# Patient Record
Sex: FEMALE | Race: WHITE | NOT HISPANIC OR LATINO | Employment: OTHER | URBAN - NONMETROPOLITAN AREA
[De-identification: names, ages, dates, MRNs, and addresses within clinical notes are randomized per-mention and may not be internally consistent; named-entity substitution may affect disease eponyms.]

---

## 2023-01-28 PROBLEM — R41.3 MEMORY DIFFICULTIES: Status: ACTIVE | Noted: 2023-01-28

## 2023-01-28 PROBLEM — R42 VERTIGO: Status: ACTIVE | Noted: 2023-01-28

## 2023-01-28 PROBLEM — E78.5 HYPERLIPIDEMIA: Status: ACTIVE | Noted: 2023-01-28

## 2023-01-28 PROBLEM — R25.1 TREMOR OF BOTH HANDS: Status: ACTIVE | Noted: 2023-01-28

## 2023-01-28 PROBLEM — S52.023A OLECRANON FRACTURE: Status: ACTIVE | Noted: 2023-01-28

## 2023-01-28 PROBLEM — D75.839 THROMBOCYTHEMIA: Status: ACTIVE | Noted: 2023-01-28

## 2023-01-28 PROBLEM — I63.9 STROKE (MULTI): Status: ACTIVE | Noted: 2023-01-28

## 2023-01-28 PROBLEM — I25.84 CORONARY ARTERY CALCIFICATION: Status: ACTIVE | Noted: 2023-01-28

## 2023-01-28 PROBLEM — R56.9 CONVULSION (MULTI): Status: ACTIVE | Noted: 2023-01-28

## 2023-01-28 PROBLEM — D68.9 CLOTTING DISORDER (MULTI): Status: ACTIVE | Noted: 2023-01-28

## 2023-01-28 PROBLEM — R30.0 BURNING WITH URINATION: Status: ACTIVE | Noted: 2023-01-28

## 2023-01-28 PROBLEM — R05.9 COUGH: Status: ACTIVE | Noted: 2023-01-28

## 2023-01-28 PROBLEM — R42 DIZZINESS: Status: ACTIVE | Noted: 2023-01-28

## 2023-01-28 PROBLEM — N90.89 VULVAR LESION: Status: ACTIVE | Noted: 2023-01-28

## 2023-01-28 PROBLEM — I34.1 MITRAL VALVE PROLAPSE: Status: ACTIVE | Noted: 2023-01-28

## 2023-01-28 PROBLEM — M25.511 RIGHT SHOULDER PAIN: Status: ACTIVE | Noted: 2023-01-28

## 2023-01-28 PROBLEM — R11.0 NAUSEA IN ADULT: Status: ACTIVE | Noted: 2023-01-28

## 2023-01-28 PROBLEM — L73.9 FOLLICULITIS: Status: ACTIVE | Noted: 2023-01-28

## 2023-01-28 PROBLEM — I10 HIGH BLOOD PRESSURE: Status: ACTIVE | Noted: 2023-01-28

## 2023-01-28 PROBLEM — R29.898 WEAKNESS OF SHOULDER: Status: ACTIVE | Noted: 2023-01-28

## 2023-01-28 PROBLEM — I25.10 CORONARY ARTERY CALCIFICATION: Status: ACTIVE | Noted: 2023-01-28

## 2023-01-28 PROBLEM — F32.A DEPRESSION: Status: ACTIVE | Noted: 2023-01-28

## 2023-01-28 PROBLEM — M25.611 STIFFNESS OF RIGHT SHOULDER JOINT: Status: ACTIVE | Noted: 2023-01-28

## 2023-01-28 PROBLEM — F17.200 NICOTINE DEPENDENCE: Status: ACTIVE | Noted: 2023-01-28

## 2023-01-28 RX ORDER — EVENING PRIMROSE OIL 500 MG
CAPSULE ORAL
COMMUNITY
Start: 2021-10-25 | End: 2023-05-31 | Stop reason: WASHOUT

## 2023-01-28 RX ORDER — CIPROFLOXACIN 500 MG/1
1 TABLET ORAL 2 TIMES DAILY
COMMUNITY
Start: 2021-02-13 | End: 2023-03-13 | Stop reason: ALTCHOICE

## 2023-01-28 RX ORDER — MELOXICAM 7.5 MG/1
1 TABLET ORAL DAILY
COMMUNITY
Start: 2021-03-05 | End: 2023-05-31 | Stop reason: ALTCHOICE

## 2023-01-28 RX ORDER — CALCIUM CARBONATE/VITAMIN D3 500-10/5ML
LIQUID (ML) ORAL
COMMUNITY
Start: 2021-10-25

## 2023-01-28 RX ORDER — ASPIRIN 81 MG/1
1 TABLET ORAL DAILY
COMMUNITY
Start: 2018-09-12

## 2023-01-28 RX ORDER — AMLODIPINE BESYLATE 10 MG/1
1 TABLET ORAL DAILY
COMMUNITY
Start: 2021-10-25 | End: 2023-03-24 | Stop reason: SDUPTHER

## 2023-01-28 RX ORDER — FLUOXETINE HYDROCHLORIDE 20 MG/1
1 CAPSULE ORAL DAILY
COMMUNITY
Start: 2021-02-11 | End: 2023-03-13 | Stop reason: SDUPTHER

## 2023-01-28 RX ORDER — ACETAMINOPHEN, DEXTROMETHORPHAN HBR, DOXYLAMINE SUCCINATE, PHENYLEPHRINE HCL 650; 20; 12.5; 1 MG/30ML; MG/30ML; MG/30ML; MG/30ML
1 SOLUTION ORAL DAILY
COMMUNITY
Start: 2021-10-25

## 2023-01-28 RX ORDER — FLUOXETINE 20 MG/1
1 TABLET ORAL DAILY
COMMUNITY
Start: 2021-10-25

## 2023-01-28 RX ORDER — CALCIUM CARB, CITRATE, MALATE 250 MG
CAPSULE ORAL
COMMUNITY
Start: 2021-10-25 | End: 2023-05-31 | Stop reason: WASHOUT

## 2023-01-28 RX ORDER — ACETAMINOPHEN 500 MG
1 TABLET ORAL DAILY
COMMUNITY
Start: 2021-10-25

## 2023-01-28 RX ORDER — ONDANSETRON 4 MG/1
1 TABLET, ORALLY DISINTEGRATING ORAL EVERY 8 HOURS PRN
COMMUNITY
Start: 2022-02-08 | End: 2023-03-13 | Stop reason: ALTCHOICE

## 2023-01-28 RX ORDER — IBUPROFEN 100 MG/5ML
1 SUSPENSION, ORAL (FINAL DOSE FORM) ORAL DAILY
COMMUNITY
Start: 2021-10-25

## 2023-01-28 RX ORDER — MECLIZINE HYDROCHLORIDE 25 MG/1
1 TABLET ORAL 4 TIMES DAILY PRN
COMMUNITY
Start: 2022-05-17 | End: 2023-03-13 | Stop reason: ALTCHOICE

## 2023-01-28 RX ORDER — TIZANIDINE 4 MG/1
1 TABLET ORAL 3 TIMES DAILY PRN
COMMUNITY
Start: 2021-02-11 | End: 2023-03-13 | Stop reason: ALTCHOICE

## 2023-01-28 RX ORDER — LOVASTATIN 40 MG/1
1 TABLET ORAL DAILY
COMMUNITY
Start: 2018-09-12 | End: 2023-03-24 | Stop reason: SDUPTHER

## 2023-03-10 NOTE — PROGRESS NOTES
Subjective   Patient ID: Nima Chan is a 67 y.o. female who presents for Follow-up (6 months; memory issues; review labs).  HPI    67 year old F here for follow up:    Seizure: She had another episode in February. She states that she was not started on medication for seizures at the time. Was recommended to followup [facility] in 1 week with PCP. She believes she has some sort of followup scheduled with neurology in May but doesn't know who with.     Poor short term memory: started about 6 months or so ago. She denies family noticing. [place] and she could not remember where the [place] was [place] [address]. She is having issues with retaining information.     Hemorrhagic stroke: she had it beginning of 2004, she was in ICU at [facility]. She had a neurosurgeon Dr. Villarreal (he put an electrode in someone that had teretes). She was told by him that there was no physical reason for her stroke, She was put on Dilantin. She was not allowed to drink with it so she declined.    Elevated platelets: Following with Dr. Neal.    All other systems reviewed and negative for complaint unless stated above.     Review of Systems    Objective   Physical Exam    Gen: No acute distress. Alert and oriented x3.   HEENT: Normocephalic, atraumatic. PERRLA and EOMI, no conjunctival injection. B/L EAC are clear, TM's viewed are WNL. No rhinorrhea, no oropharyngeal lesions.  Neck: No lymphadenopathy, thyroid WNL.  CV: Regular rate and rhythm. Normal S1/S2.  Resp: CTAB/L. No wheezes or rhonchi appreciated.  Abdomen: Soft. Nontender. Nondistended. Bowel sounds normoactive. No guarding or rigidity.  Derm: Skin is warm and dry. No rashes appreciated or suspicious lesions noted.   Neuro: Cranial nerves intact. Normal gait.  Psych: Appropriate mood and affect. Normal speech and eye contact.   Extremities: No deformities appreciated. No severe edema.    Assessment/Plan        66yo female here for  followup:    #Elevated platelets  followup with heme/onc  suspect myeloproliferative disorder  asymptomatic, monitor    #Memory difficulties  encouraged socialization  MOCA test today    #HTN   continue amlodipine  Stress test showed occasional PVCs   continue asa     #Depression  Continue fluoxetine    #Hand Tremor   Will discuss at followup    #HCM  C-scope next due 2025  UTD for covid, declining flu and PNA vaccines     Spoke with son Sukh and disclosed that Nima had an appt in May, unsure if neurology, and he states that she does but he doesn't know who the neurologist was. States that she does have transport to this appointment. I recommended against her driving due to recent seizure. I recommended to consider trying keppra while awaiting neuro eval. Son states that he has concerns about sleep apnea and states that her symptoms occur only during sleep/at night.

## 2023-03-13 ENCOUNTER — OFFICE VISIT (OUTPATIENT)
Dept: PRIMARY CARE | Facility: CLINIC | Age: 68
End: 2023-03-13
Payer: MEDICARE

## 2023-03-13 VITALS
SYSTOLIC BLOOD PRESSURE: 158 MMHG | HEIGHT: 64 IN | HEART RATE: 80 BPM | DIASTOLIC BLOOD PRESSURE: 83 MMHG | BODY MASS INDEX: 23.22 KG/M2 | WEIGHT: 136 LBS

## 2023-03-13 DIAGNOSIS — G40.909 SEIZURE DISORDER (MULTI): Primary | ICD-10-CM

## 2023-03-13 DIAGNOSIS — I63.9 CEREBROVASCULAR ACCIDENT (CVA), UNSPECIFIED MECHANISM (MULTI): ICD-10-CM

## 2023-03-13 DIAGNOSIS — I10 PRIMARY HYPERTENSION: ICD-10-CM

## 2023-03-13 PROCEDURE — 3079F DIAST BP 80-89 MM HG: CPT | Performed by: FAMILY MEDICINE

## 2023-03-13 PROCEDURE — 99214 OFFICE O/P EST MOD 30 MIN: CPT | Performed by: FAMILY MEDICINE

## 2023-03-13 PROCEDURE — 3077F SYST BP >= 140 MM HG: CPT | Performed by: FAMILY MEDICINE

## 2023-03-13 PROCEDURE — 1159F MED LIST DOCD IN RCRD: CPT | Performed by: FAMILY MEDICINE

## 2023-03-13 RX ORDER — LEVETIRACETAM 500 MG/1
500 TABLET ORAL 2 TIMES DAILY
Qty: 60 TABLET | Refills: 2 | Status: SHIPPED | OUTPATIENT
Start: 2023-03-13 | End: 2023-03-14

## 2023-03-13 NOTE — PATIENT INSTRUCTIONS
Neurology:  Tejal Haywood ([facility]) [phone]  Lencho Johnson ([facility]) [phone]  Ugo Alvarez () [phone]  Danielle Garcia () [phone]  Dustin Mix () [phone]

## 2023-03-14 DIAGNOSIS — G40.909 SEIZURE DISORDER (MULTI): ICD-10-CM

## 2023-03-14 RX ORDER — LEVETIRACETAM 500 MG/1
TABLET ORAL
Qty: 180 TABLET | Refills: 1 | Status: SHIPPED | OUTPATIENT
Start: 2023-03-14 | End: 2023-05-31 | Stop reason: ALTCHOICE

## 2023-03-14 RX ORDER — TRAMADOL HYDROCHLORIDE 50 MG/1
50 TABLET ORAL EVERY 6 HOURS PRN
COMMUNITY
Start: 2022-04-29 | End: 2023-05-30 | Stop reason: ALTCHOICE

## 2023-03-24 DIAGNOSIS — E78.49 OTHER HYPERLIPIDEMIA: ICD-10-CM

## 2023-03-24 DIAGNOSIS — I10 PRIMARY HYPERTENSION: ICD-10-CM

## 2023-03-24 RX ORDER — AMLODIPINE BESYLATE 10 MG/1
10 TABLET ORAL DAILY
Qty: 30 TABLET | Refills: 11 | Status: SHIPPED | OUTPATIENT
Start: 2023-03-24 | End: 2023-03-31 | Stop reason: SDUPTHER

## 2023-03-24 RX ORDER — LOVASTATIN 40 MG/1
40 TABLET ORAL NIGHTLY
Qty: 30 TABLET | Refills: 11 | Status: SHIPPED | OUTPATIENT
Start: 2023-03-24 | End: 2023-10-24 | Stop reason: WASHOUT

## 2023-03-31 DIAGNOSIS — I10 PRIMARY HYPERTENSION: Primary | ICD-10-CM

## 2023-03-31 RX ORDER — AMLODIPINE BESYLATE 10 MG/1
10 TABLET ORAL DAILY
Qty: 30 TABLET | Refills: 11 | Status: SHIPPED | OUTPATIENT
Start: 2023-03-31 | End: 2024-05-28

## 2023-04-18 ENCOUNTER — TELEPHONE (OUTPATIENT)
Dept: PRIMARY CARE | Facility: CLINIC | Age: 68
End: 2023-04-18
Payer: MEDICARE

## 2023-04-18 DIAGNOSIS — G47.30 SLEEP APNEA, UNSPECIFIED TYPE: ICD-10-CM

## 2023-04-18 DIAGNOSIS — G47.9 SLEEP DISORDER, UNSPECIFIED: Primary | ICD-10-CM

## 2023-05-30 PROBLEM — R74.8 ELEVATED CREATINE KINASE LEVEL: Status: RESOLVED | Noted: 2023-05-30 | Resolved: 2023-05-30

## 2023-05-30 NOTE — PROGRESS NOTES
Subjective   Patient ID: Nima Chan is a 67 y.o. female who presents for Follow-up (Pt presents today for a 2 month follow up; both hand tremors, more so the right hand; no seizures; saw neuro in [place], apt in Aug with Dr. Dillon; saw neuro last week, Dr. Mckinney; ).    HPI   Seizure: She had another episode in February. She states that she was not started on medication for seizures at the time. Saw neuro last week in [place], Dr. Mckinney. Had testing that took over 3 hours. Challenging at times for her.   She has an appointment in August 23rd for Neuro psych. He is going to go over the testing with her. Not taking Keppra, no medications from neurology.     Hand tremors: More in the right hand. Still bothering her.      Poor short term memory: started about 6 months or so ago. She denies family noticing. [place] and she could not remember where the [place] was on [facility]. She is having issues with retaining information.      Hemorrhagic stroke: she had it beginning of 2004, she was in ICU at [facility]. She had a neurosurgeon Dr. Villarreal (he put an electrode in someone that had teretes). She was told by him that there was no physical reason for her stroke, She was put on Dilantin. She was not allowed to drink with it so she declined.     Elevated platelets: Following with Dr. Neal.     All other systems reviewed and negative for complaint unless stated above.     Review of Systems   Constitutional:  Negative for chills and fever.   HENT:  Negative for congestion, ear pain and rhinorrhea.    Eyes:  Negative for discharge and redness.   Respiratory:  Negative for cough, shortness of breath and wheezing.    Cardiovascular:  Negative for chest pain and leg swelling.   Gastrointestinal:  Negative for abdominal pain, constipation, diarrhea, nausea and vomiting.   Genitourinary:  Negative for difficulty urinating, frequency and urgency.   Musculoskeletal:  Negative for gait  "problem.   Skin:  Negative for rash and wound.   Neurological:  Negative for dizziness, weakness and headaches.   Psychiatric/Behavioral:  Negative for confusion. The patient is not nervous/anxious.        Objective   /80 (BP Location: Right arm, Patient Position: Sitting, BP Cuff Size: Adult)   Pulse 90   Ht 1.626 m (5' 4\")   Wt 61.6 kg (135 lb 12.8 oz)   BMI 23.31 kg/m²     Physical Exam  Vitals reviewed.   Constitutional:       Appearance: Normal appearance.   HENT:      Head: Normocephalic.      Right Ear: Tympanic membrane, ear canal and external ear normal.      Left Ear: Tympanic membrane, ear canal and external ear normal.      Nose: No rhinorrhea.      Mouth/Throat:      Mouth: Mucous membranes are moist.      Pharynx: Oropharynx is clear.   Eyes:      Pupils: Pupils are equal, round, and reactive to light.   Cardiovascular:      Rate and Rhythm: Normal rate and regular rhythm.      Pulses: Normal pulses.   Pulmonary:      Effort: Pulmonary effort is normal.      Breath sounds: Normal breath sounds.   Abdominal:      General: Abdomen is flat. Bowel sounds are normal.      Palpations: Abdomen is soft.   Musculoskeletal:         General: No tenderness. Normal range of motion.      Right lower leg: No edema.      Left lower leg: No edema.   Lymphadenopathy:      Cervical: No cervical adenopathy.   Skin:     General: Skin is warm and dry.      Findings: No rash.   Neurological:      Mental Status: She is alert and oriented to person, place, and time.   Psychiatric:         Mood and Affect: Mood normal.         Behavior: Behavior normal.       Assessment/Plan     #Elevated platelets  followup with heme/onc  suspect myeloproliferative disorder  asymptomatic, monitor     #Memory difficulties  encouraged socialization  MOCA test 26/30 in September  Neuro cleared for short distances      #HTN   continue amlodipine  Stress test showed occasional PVCs   continue asa      #Depression  Continue fluoxetine   "   #Hand Tremor   Trial propranolol      #HCM  C-scope next due 2025  UTD for covid, declining flu and PNA vaccines      Spoke with son Sukh and disclosed that Nima had an appt in May, unsure if neurology, and he states that she does but he doesn't know who the neurologist was. States that she does have transport to this appointment. I recommended against her driving due to recent seizure. I recommended to consider trying keppra while awaiting neuro eval. Son states that he has concerns about sleep apnea and states that her symptoms occur only during sleep/at night.

## 2023-05-31 ENCOUNTER — OFFICE VISIT (OUTPATIENT)
Dept: PRIMARY CARE | Facility: CLINIC | Age: 68
End: 2023-05-31
Payer: MEDICARE

## 2023-05-31 VITALS
SYSTOLIC BLOOD PRESSURE: 163 MMHG | DIASTOLIC BLOOD PRESSURE: 80 MMHG | HEIGHT: 64 IN | BODY MASS INDEX: 23.18 KG/M2 | WEIGHT: 135.8 LBS | HEART RATE: 90 BPM

## 2023-05-31 DIAGNOSIS — R25.1 TREMOR OF BOTH HANDS: Primary | ICD-10-CM

## 2023-05-31 PROCEDURE — 3079F DIAST BP 80-89 MM HG: CPT | Performed by: REGISTERED NURSE

## 2023-05-31 PROCEDURE — 1159F MED LIST DOCD IN RCRD: CPT | Performed by: REGISTERED NURSE

## 2023-05-31 PROCEDURE — 99213 OFFICE O/P EST LOW 20 MIN: CPT | Performed by: REGISTERED NURSE

## 2023-05-31 PROCEDURE — 3077F SYST BP >= 140 MM HG: CPT | Performed by: REGISTERED NURSE

## 2023-05-31 RX ORDER — PROPRANOLOL HYDROCHLORIDE 20 MG/1
20 TABLET ORAL 3 TIMES DAILY
Qty: 90 TABLET | Refills: 5 | Status: SHIPPED | OUTPATIENT
Start: 2023-05-31 | End: 2023-06-23

## 2023-05-31 ASSESSMENT — ENCOUNTER SYMPTOMS
RHINORRHEA: 0
NAUSEA: 0
NERVOUS/ANXIOUS: 0
WEAKNESS: 0
FEVER: 0
EYE REDNESS: 0
FREQUENCY: 0
CONSTIPATION: 0
ABDOMINAL PAIN: 0
DIZZINESS: 0
CHILLS: 0
WOUND: 0
HEADACHES: 0
WHEEZING: 0
SHORTNESS OF BREATH: 0
CONFUSION: 0
DIARRHEA: 0
COUGH: 0
DIFFICULTY URINATING: 0
EYE DISCHARGE: 0
VOMITING: 0

## 2023-06-23 DIAGNOSIS — R25.1 TREMOR OF BOTH HANDS: ICD-10-CM

## 2023-06-23 RX ORDER — PROPRANOLOL HYDROCHLORIDE 20 MG/1
TABLET ORAL
Qty: 90 TABLET | Refills: 5 | Status: SHIPPED | OUTPATIENT
Start: 2023-06-23 | End: 2023-10-16

## 2023-07-27 NOTE — PROGRESS NOTES
Subjective   Reason for Visit: Nima Chan is an 68 y.o. female here for a Medicare Wellness visit.          Reviewed all medications by prescribing practitioner or clinical pharmacist (such as prescriptions, OTCs, herbal therapies and supplements) and documented in the medical record.    HPI  Seizure: She had another episode in February. She states that she was not started on medication for seizures at the time. Saw neuro last week in [place], Dr. Mckinney. Had testing that took over 3 hours. Challenging at times for her.   She has an appointment in August 23rd for Neuro psych. He is going to go over the testing with her. Not taking Keppra, no medications from neurology.     Hand tremors: More in the right hand. Still bothering her. She noticed improvement with propanolol. Would like to continue current dose.      Poor short term memory: started about 6 months or so ago. She denies family noticing. [place] and she could not remember where the [place] was on [facility]. She is having issues with retaining information.      Hemorrhagic stroke: she had it beginning of 2004, she was in ICU at [facility]. She had a neurosurgeon Dr. Villarreal (he put an electrode in someone that had teretes). She was told by him that there was no physical reason for her stroke, She was put on Dilantin. She was not allowed to drink with it so she declined.     Elevated platelets: Following with Dr. Neal.     All other systems reviewed and negative for complaint unless stated above.     Patient Care Team:  DANIELLE Preciado-CNP as PCP - General  Elizabeth Suero DO as PCP - [employer] PCP     Review of Systems   Constitutional:  Negative for chills and fever.   HENT:  Negative for congestion, ear pain and rhinorrhea.    Eyes:  Negative for discharge and redness.   Respiratory:  Negative for cough, shortness of breath and wheezing.    Cardiovascular:  Negative for chest pain and  "leg swelling.   Gastrointestinal:  Negative for abdominal pain, constipation, diarrhea, nausea and vomiting.   Genitourinary:  Negative for difficulty urinating, frequency and urgency.   Musculoskeletal:  Negative for gait problem.   Skin:  Negative for rash and wound.   Neurological:  Negative for dizziness, weakness and headaches.   Psychiatric/Behavioral:  Negative for confusion. The patient is not nervous/anxious.        Objective   Vitals:  /81 (BP Location: Right arm, Patient Position: Sitting, BP Cuff Size: Adult)   Pulse 74   Ht 1.626 m (5' 4\")   Wt 61.5 kg (135 lb 9.6 oz)   SpO2 97%   BMI 23.28 kg/m²       Physical Exam  Vitals reviewed.   Constitutional:       Appearance: Normal appearance.   HENT:      Head: Normocephalic.      Right Ear: Tympanic membrane, ear canal and external ear normal.      Left Ear: Tympanic membrane, ear canal and external ear normal.      Nose: No rhinorrhea.      Mouth/Throat:      Mouth: Mucous membranes are moist.      Pharynx: Oropharynx is clear.   Eyes:      Pupils: Pupils are equal, round, and reactive to light.   Cardiovascular:      Rate and Rhythm: Normal rate and regular rhythm.      Pulses: Normal pulses.   Pulmonary:      Effort: Pulmonary effort is normal.      Breath sounds: Normal breath sounds.   Abdominal:      General: Abdomen is flat. Bowel sounds are normal.      Palpations: Abdomen is soft.   Musculoskeletal:         General: No tenderness. Normal range of motion.      Right lower leg: No edema.      Left lower leg: No edema.   Lymphadenopathy:      Cervical: No cervical adenopathy.   Skin:     General: Skin is warm and dry.      Findings: No rash.   Neurological:      Mental Status: She is alert and oriented to person, place, and time.   Psychiatric:         Mood and Affect: Mood normal.         Behavior: Behavior normal.       Assessment/Plan   Problem List Items Addressed This Visit    None       #Elevated platelets  followup with " heme/onc  suspect myeloproliferative disorder  asymptomatic, monitor     #Memory difficulties  encouraged socialization  MOCA test 26/30 in September 2022  Will repeat at follow up in October 2024  Neuro cleared for short distances      #HTN   continue amlodipine  Stress test showed occasional PVCs   continue asa      #Depression  Continue fluoxetine     #Hand Tremor   Propranolol is working well   Continue   May need to increase frequency/dose in future         #HCM  C-scope next due 2025  UTD for covid, declining flu and PNA vaccines   Mammogram in 2020, declines today

## 2023-07-28 ENCOUNTER — OFFICE VISIT (OUTPATIENT)
Dept: PRIMARY CARE | Facility: CLINIC | Age: 68
End: 2023-07-28
Payer: MEDICARE

## 2023-07-28 VITALS
BODY MASS INDEX: 23.15 KG/M2 | DIASTOLIC BLOOD PRESSURE: 81 MMHG | OXYGEN SATURATION: 97 % | WEIGHT: 135.6 LBS | HEIGHT: 64 IN | HEART RATE: 74 BPM | SYSTOLIC BLOOD PRESSURE: 167 MMHG

## 2023-07-28 DIAGNOSIS — Z00.00 ROUTINE GENERAL MEDICAL EXAMINATION AT HEALTH CARE FACILITY: Primary | ICD-10-CM

## 2023-07-28 DIAGNOSIS — R25.1 TREMOR OF BOTH HANDS: ICD-10-CM

## 2023-07-28 DIAGNOSIS — I10 PRIMARY HYPERTENSION: ICD-10-CM

## 2023-07-28 DIAGNOSIS — R41.3 MEMORY DIFFICULTIES: ICD-10-CM

## 2023-07-28 PROCEDURE — 1159F MED LIST DOCD IN RCRD: CPT | Performed by: REGISTERED NURSE

## 2023-07-28 PROCEDURE — 1126F AMNT PAIN NOTED NONE PRSNT: CPT | Performed by: REGISTERED NURSE

## 2023-07-28 PROCEDURE — 3079F DIAST BP 80-89 MM HG: CPT | Performed by: REGISTERED NURSE

## 2023-07-28 PROCEDURE — 1170F FXNL STATUS ASSESSED: CPT | Performed by: REGISTERED NURSE

## 2023-07-28 PROCEDURE — G0439 PPPS, SUBSEQ VISIT: HCPCS | Performed by: REGISTERED NURSE

## 2023-07-28 PROCEDURE — 3077F SYST BP >= 140 MM HG: CPT | Performed by: REGISTERED NURSE

## 2023-07-28 ASSESSMENT — ACTIVITIES OF DAILY LIVING (ADL)
MANAGING_FINANCES: INDEPENDENT
DRESSING: INDEPENDENT
TAKING_MEDICATION: INDEPENDENT
DOING_HOUSEWORK: INDEPENDENT
GROCERY_SHOPPING: INDEPENDENT
BATHING: INDEPENDENT

## 2023-07-28 ASSESSMENT — ENCOUNTER SYMPTOMS
NERVOUS/ANXIOUS: 0
ABDOMINAL PAIN: 0
NAUSEA: 0
COUGH: 0
CONSTIPATION: 0
FREQUENCY: 0
DEPRESSION: 0
DIARRHEA: 0
DIZZINESS: 0
EYE DISCHARGE: 0
DIFFICULTY URINATING: 0
EYE REDNESS: 0
HEADACHES: 0
CHILLS: 0
WOUND: 0
SHORTNESS OF BREATH: 0
VOMITING: 0
OCCASIONAL FEELINGS OF UNSTEADINESS: 0
FEVER: 0
WHEEZING: 0
LOSS OF SENSATION IN FEET: 0
WEAKNESS: 0
CONFUSION: 0
RHINORRHEA: 0

## 2023-07-28 ASSESSMENT — PATIENT HEALTH QUESTIONNAIRE - PHQ9
2. FEELING DOWN, DEPRESSED OR HOPELESS: NOT AT ALL
1. LITTLE INTEREST OR PLEASURE IN DOING THINGS: NOT AT ALL
SUM OF ALL RESPONSES TO PHQ9 QUESTIONS 1 AND 2: 0

## 2023-08-01 ENCOUNTER — PATIENT OUTREACH (OUTPATIENT)
Dept: PRIMARY CARE | Facility: CLINIC | Age: 68
End: 2023-08-01
Payer: MEDICARE

## 2023-08-01 NOTE — PROGRESS NOTES
Discharge Facility: [facility]   Discharge Diagnosis:  alcohol withdrawal seizures  Admission Date: 7-30-23  Discharge Date: 7-31-23     PCP Appointment Date: 8-7-23  TCM call completed (8-2-23)  Patient is scheduled within 7-14 days of discharge on (8-7-23 ) for hospital follow up, however was unable to reach patient during two business days after discharge despite at least two attempts made.  I  f patient meets criteria for moderately complex & has follow-up within 14 days-can bill [id number] for hospital follow up.   If patient meets criteria for highly complex & has follow-up visit within 7 days-can bill [id number] for hospital follow up    * Virtual follow up needs modifier added (95 or GT)  AWV AND TCM CAN BE BILLED TOGETHER WITH 25 MODIFIER  Nikki Wakefield LPN

## 2023-08-02 RX ORDER — GABAPENTIN 100 MG/1
CAPSULE ORAL
COMMUNITY
Start: 2023-07-31 | End: 2023-08-31 | Stop reason: SDUPTHER

## 2023-08-02 RX ORDER — CARBAMAZEPINE 200 MG/1
TABLET, EXTENDED RELEASE ORAL
COMMUNITY
Start: 2023-07-31 | End: 2023-08-07 | Stop reason: SDUPTHER

## 2023-08-02 NOTE — PROGRESS NOTES
"Subjective   Patient ID: Nima Chan is a 68 y.o. female who presents for Hospital Follow-up (She states she if feeling constantly tired since hospital, haven't had a beer since; was given gabapentin and carbamazepine and would like to discuss these two; blood work to be ordered; ).    HPI     HFU: Etoh withdrawal seizures. Thinks the medications she is on is causing her to be tired. Would like to lower dose.   Concerned for liver disease, recent blood work has been unremarkable, her CT abdomen showed a cyst on on liver.   States she is always tired. States she has not had a beer since she left the hospital. She was started on gabapentin and carbamazepine.     All other systems reviewed and negative for complaint unless stated above.    Review of Systems    Objective   BP (!) 181/89 (BP Location: Right arm, Patient Position: Sitting, BP Cuff Size: Adult)   Pulse 68   Ht 1.626 m (5' 4\")   Wt 61.5 kg (135 lb 9.6 oz)   SpO2 100%   BMI 23.28 kg/m²     Physical Exam  Vitals reviewed.   Constitutional:       Appearance: Normal appearance.   HENT:      Head: Normocephalic.      Right Ear: Tympanic membrane, ear canal and external ear normal.      Left Ear: Tympanic membrane, ear canal and external ear normal.      Nose: No rhinorrhea.      Mouth/Throat:      Mouth: Mucous membranes are moist.      Pharynx: Oropharynx is clear.   Eyes:      Pupils: Pupils are equal, round, and reactive to light.   Cardiovascular:      Rate and Rhythm: Normal rate and regular rhythm.      Pulses: Normal pulses.   Pulmonary:      Effort: Pulmonary effort is normal.      Breath sounds: Normal breath sounds.   Abdominal:      General: Abdomen is flat. Bowel sounds are normal.      Palpations: Abdomen is soft.   Musculoskeletal:         General: No tenderness. Normal range of motion.      Right lower leg: No edema.      Left lower leg: No edema.   Lymphadenopathy:      Cervical: No cervical adenopathy.   Skin:     General: Skin is warm " and dry.      Findings: No rash.   Neurological:      Mental Status: She is alert and oriented to person, place, and time.   Psychiatric:         Mood and Affect: Mood normal.         Behavior: Behavior normal.         Assessment/Plan        #Hfu   #Etoh use   #Seizures  Continue to abstain from etoh!!   Follow up with neuro on 8/23  Continue gabapentin and carbamazepine   Do not drive until eval with neuro   Discussed cutting back on marijuana use

## 2023-08-07 ENCOUNTER — OFFICE VISIT (OUTPATIENT)
Dept: PRIMARY CARE | Facility: CLINIC | Age: 68
End: 2023-08-07
Payer: MEDICARE

## 2023-08-07 VITALS
WEIGHT: 135.6 LBS | SYSTOLIC BLOOD PRESSURE: 181 MMHG | HEART RATE: 68 BPM | OXYGEN SATURATION: 100 % | HEIGHT: 64 IN | BODY MASS INDEX: 23.15 KG/M2 | DIASTOLIC BLOOD PRESSURE: 89 MMHG

## 2023-08-07 DIAGNOSIS — G47.30 SLEEP APNEA, UNSPECIFIED TYPE: ICD-10-CM

## 2023-08-07 DIAGNOSIS — E87.8 ELECTROLYTE IMBALANCE: ICD-10-CM

## 2023-08-07 DIAGNOSIS — Z09 HOSPITAL DISCHARGE FOLLOW-UP: Primary | ICD-10-CM

## 2023-08-07 DIAGNOSIS — R56.9 SEIZURE (MULTI): ICD-10-CM

## 2023-08-07 PROCEDURE — 1159F MED LIST DOCD IN RCRD: CPT | Performed by: REGISTERED NURSE

## 2023-08-07 PROCEDURE — 1160F RVW MEDS BY RX/DR IN RCRD: CPT | Performed by: REGISTERED NURSE

## 2023-08-07 PROCEDURE — 3079F DIAST BP 80-89 MM HG: CPT | Performed by: REGISTERED NURSE

## 2023-08-07 PROCEDURE — 99214 OFFICE O/P EST MOD 30 MIN: CPT | Performed by: REGISTERED NURSE

## 2023-08-07 PROCEDURE — 1126F AMNT PAIN NOTED NONE PRSNT: CPT | Performed by: REGISTERED NURSE

## 2023-08-07 PROCEDURE — 3077F SYST BP >= 140 MM HG: CPT | Performed by: REGISTERED NURSE

## 2023-08-07 RX ORDER — CARBAMAZEPINE 100 MG/1
100 TABLET, EXTENDED RELEASE ORAL 2 TIMES DAILY
Qty: 60 TABLET | Refills: 1 | Status: SHIPPED | OUTPATIENT
Start: 2023-08-07 | End: 2023-08-31 | Stop reason: SDUPTHER

## 2023-08-09 ENCOUNTER — LAB (OUTPATIENT)
Dept: LAB | Facility: LAB | Age: 68
End: 2023-08-09
Payer: MEDICARE

## 2023-08-09 DIAGNOSIS — E87.8 ELECTROLYTE IMBALANCE: ICD-10-CM

## 2023-08-09 LAB
ALANINE AMINOTRANSFERASE (SGPT) (U/L) IN SER/PLAS: 13 U/L (ref 7–45)
ALBUMIN (G/DL) IN SER/PLAS: 4.8 G/DL (ref 3.4–5)
ALKALINE PHOSPHATASE (U/L) IN SER/PLAS: 72 U/L (ref 33–136)
ANION GAP IN SER/PLAS: 12 MMOL/L (ref 10–20)
ASPARTATE AMINOTRANSFERASE (SGOT) (U/L) IN SER/PLAS: 16 U/L (ref 9–39)
BASOPHILS (10*3/UL) IN BLOOD BY AUTOMATED COUNT: 0.11 X10E9/L (ref 0–0.1)
BASOPHILS/100 LEUKOCYTES IN BLOOD BY AUTOMATED COUNT: 1.2 % (ref 0–2)
BILIRUBIN TOTAL (MG/DL) IN SER/PLAS: 0.3 MG/DL (ref 0–1.2)
CALCIUM (MG/DL) IN SER/PLAS: 9.3 MG/DL (ref 8.6–10.3)
CARBON DIOXIDE, TOTAL (MMOL/L) IN SER/PLAS: 29 MMOL/L (ref 21–32)
CHLORIDE (MMOL/L) IN SER/PLAS: 88 MMOL/L (ref 98–107)
CREATININE (MG/DL) IN SER/PLAS: 0.6 MG/DL (ref 0.5–1.05)
EOSINOPHILS (10*3/UL) IN BLOOD BY AUTOMATED COUNT: 0.36 X10E9/L (ref 0–0.7)
EOSINOPHILS/100 LEUKOCYTES IN BLOOD BY AUTOMATED COUNT: 4 % (ref 0–6)
ERYTHROCYTE DISTRIBUTION WIDTH (RATIO) BY AUTOMATED COUNT: 12.2 % (ref 11.5–14.5)
ERYTHROCYTE MEAN CORPUSCULAR HEMOGLOBIN CONCENTRATION (G/DL) BY AUTOMATED: 34.7 G/DL (ref 32–36)
ERYTHROCYTE MEAN CORPUSCULAR VOLUME (FL) BY AUTOMATED COUNT: 93 FL (ref 80–100)
ERYTHROCYTES (10*6/UL) IN BLOOD BY AUTOMATED COUNT: 4.1 X10E12/L (ref 4–5.2)
GFR FEMALE: >90 ML/MIN/1.73M2
GLUCOSE (MG/DL) IN SER/PLAS: 93 MG/DL (ref 74–99)
HEMATOCRIT (%) IN BLOOD BY AUTOMATED COUNT: 38 % (ref 36–46)
HEMOGLOBIN (G/DL) IN BLOOD: 13.2 G/DL (ref 12–16)
IMMATURE GRANULOCYTES/100 LEUKOCYTES IN BLOOD BY AUTOMATED COUNT: 0.2 % (ref 0–0.9)
LEUKOCYTES (10*3/UL) IN BLOOD BY AUTOMATED COUNT: 9 X10E9/L (ref 4.4–11.3)
LYMPHOCYTES (10*3/UL) IN BLOOD BY AUTOMATED COUNT: 2.78 X10E9/L (ref 1.2–4.8)
LYMPHOCYTES/100 LEUKOCYTES IN BLOOD BY AUTOMATED COUNT: 31 % (ref 13–44)
MONOCYTES (10*3/UL) IN BLOOD BY AUTOMATED COUNT: 0.84 X10E9/L (ref 0.1–1)
MONOCYTES/100 LEUKOCYTES IN BLOOD BY AUTOMATED COUNT: 9.4 % (ref 2–10)
NEUTROPHILS (10*3/UL) IN BLOOD BY AUTOMATED COUNT: 4.87 X10E9/L (ref 1.2–7.7)
NEUTROPHILS/100 LEUKOCYTES IN BLOOD BY AUTOMATED COUNT: 54.2 % (ref 40–80)
PLATELETS (10*3/UL) IN BLOOD AUTOMATED COUNT: 734 X10E9/L (ref 150–450)
POTASSIUM (MMOL/L) IN SER/PLAS: 4.4 MMOL/L (ref 3.5–5.3)
PROTEIN TOTAL: 6.8 G/DL (ref 6.4–8.2)
SODIUM (MMOL/L) IN SER/PLAS: 125 MMOL/L (ref 136–145)
UREA NITROGEN (MG/DL) IN SER/PLAS: 12 MG/DL (ref 6–23)

## 2023-08-09 PROCEDURE — 85025 COMPLETE CBC W/AUTO DIFF WBC: CPT

## 2023-08-09 PROCEDURE — 36415 COLL VENOUS BLD VENIPUNCTURE: CPT

## 2023-08-09 PROCEDURE — 80053 COMPREHEN METABOLIC PANEL: CPT

## 2023-08-14 DIAGNOSIS — E87.1 HYPONATREMIA: Primary | ICD-10-CM

## 2023-08-15 RX ORDER — SODIUM CHLORIDE 1000 MG
1 TABLET, SOLUBLE MISCELLANEOUS DAILY
Qty: 30 TABLET | Refills: 0 | Status: SHIPPED | OUTPATIENT
Start: 2023-08-15 | End: 2023-10-16

## 2023-08-16 ENCOUNTER — PATIENT OUTREACH (OUTPATIENT)
Dept: PRIMARY CARE | Facility: CLINIC | Age: 68
End: 2023-08-16
Payer: MEDICARE

## 2023-08-16 NOTE — PROGRESS NOTES
Call regarding appt. with PCP on (8-7-23) after hospitalization.  At time of outreach call the patient feels as if their condition has (improved) since last visit.  Reviewed the PCP appointment with the pt and addressed any questions or concerns.  Nikki Wakefield LPN

## 2023-08-31 ENCOUNTER — PATIENT OUTREACH (OUTPATIENT)
Dept: PRIMARY CARE | Facility: CLINIC | Age: 68
End: 2023-08-31
Payer: MEDICARE

## 2023-08-31 DIAGNOSIS — R56.9 SEIZURE (MULTI): ICD-10-CM

## 2023-08-31 RX ORDER — CARBAMAZEPINE 100 MG/1
100 TABLET, EXTENDED RELEASE ORAL 2 TIMES DAILY
Qty: 60 TABLET | Refills: 1 | Status: SHIPPED | OUTPATIENT
Start: 2023-08-31 | End: 2023-11-16

## 2023-08-31 RX ORDER — GABAPENTIN 100 MG/1
100 CAPSULE ORAL 3 TIMES DAILY
Qty: 90 CAPSULE | Refills: 1 | Status: SHIPPED | OUTPATIENT
Start: 2023-08-31 | End: 2023-11-16

## 2023-08-31 NOTE — PROGRESS NOTES
Unable to reach patient for one month post discharge follow up call.   LVM with call back number for patient to call if needed   If no voicemail available call attempts x 2 were made to contact the patient to assist with any questions or concerns patient may have.  Nikki Wakefield LPN

## 2023-10-02 ENCOUNTER — OFFICE VISIT (OUTPATIENT)
Dept: SLEEP MEDICINE | Facility: CLINIC | Age: 68
End: 2023-10-02
Payer: MEDICARE

## 2023-10-02 VITALS
WEIGHT: 136.1 LBS | HEIGHT: 64 IN | OXYGEN SATURATION: 98 % | SYSTOLIC BLOOD PRESSURE: 143 MMHG | BODY MASS INDEX: 23.23 KG/M2 | DIASTOLIC BLOOD PRESSURE: 84 MMHG | HEART RATE: 77 BPM

## 2023-10-02 DIAGNOSIS — F12.90 MARIJUANA USE: ICD-10-CM

## 2023-10-02 DIAGNOSIS — R68.89 VIVID DREAM: ICD-10-CM

## 2023-10-02 DIAGNOSIS — E66.3 OVERWEIGHT: ICD-10-CM

## 2023-10-02 DIAGNOSIS — G47.33 OSA (OBSTRUCTIVE SLEEP APNEA): Primary | ICD-10-CM

## 2023-10-02 DIAGNOSIS — F17.200 SMOKER: ICD-10-CM

## 2023-10-02 PROCEDURE — 1160F RVW MEDS BY RX/DR IN RCRD: CPT

## 2023-10-02 PROCEDURE — 99203 OFFICE O/P NEW LOW 30 MIN: CPT

## 2023-10-02 PROCEDURE — 3077F SYST BP >= 140 MM HG: CPT

## 2023-10-02 PROCEDURE — 3079F DIAST BP 80-89 MM HG: CPT

## 2023-10-02 PROCEDURE — 1126F AMNT PAIN NOTED NONE PRSNT: CPT

## 2023-10-02 PROCEDURE — 1159F MED LIST DOCD IN RCRD: CPT

## 2023-10-02 NOTE — PATIENT INSTRUCTIONS
It was a pleasure meeting you today Miss Nima Chan.    As we discussed today in clinic:    1. I ordered you a CPAP machine today. We discussed about 30-day mask guarantee and insurance requirement for PAP compliance.   2. Encouraged to lose weight.   3. Remember, don't drive when sleepy.   4. Stay off your back when sleeping.  5. We will start with Resmed N30i mask.        As a general guideline, please replace your: PAP cushions every 2-4 weeks, mask every 3-6 months, hose every 3-6 months, Filter (disposable) every 2-4 weeks; machine may need replacement in 5-10 years (once they stop working).     Education handouts given: Sleep Apnea Information / Sleep Hygiene / PAP Handouts / Cleaning Schedule      Follow-up: 31 - 90 days after getting new machine.    ALWAYS BRING YOUR CPAP / BIPAP WITH YOU TO EVERY APPOINTMENT!  THANKS    EDUCATION WEBSITES for further information:   1. American Academy of Sleep Medicine http://sleepeducation.org  2. National Sleep Foundation: https://sleepfoundation.org  3. American Sleep Apnea Association: https://www.sleepapnea.org (for patients with sleep apnea)  4. Go to www.inspiresleep.com learn about [employer].    FOR QUESTIONS AND CONCERNS:   1. In case of problems with machine or mask interface, please contact your DME company first. DME is the company that provides you the machine and/or CPAP supplies. If [employer] if your DME, you can reach them at [phone] or [employer] ([email or web address]) [phone].  2. For SLEEP STUDY appointments, please call [phone] ([place]) or [phone] / [phone] ([facility]) or any other  Sleep Lab location please call [phone]  3. For MEDICAL QUESTIONS, MEDICATION REFILLS, or CLINIC APPOINTMENT SCHEDULING, please call [phone] and my practice lead [place] would gladly assist you with any concerns.   4. In the event that you are running more than 10 minutes late to your appointment or 5 minutes to virtual, I will  kindly ask you to reschedule.     Here at [facility], we wish you a restful sleep!

## 2023-10-02 NOTE — PROGRESS NOTES
Patient: Nima Chan  : 1955 AGE: 68 y.o. SEX:female   MRN: [id number]   Provider: RENARD Ramirez     Location [facility] SLEEP MEDICINE   Service Date: 10/9/2023     PCP: RENARD Preciado   Referred by: RENARD Preciado             [facility]  NEW PATIENT VISIT NOTE        HISTORY OF PRESENT ILLNESS     The patient's referring provider is: RENARD Preciado  The patient's Primary Care Provider: RENARD Preciado    HISTORY OF PRESENT ILLNESS   Nima Chan is a 68 y.o. female with h/o sleep disordered breathing who presents to a [facility] for a sleep medicine evaluation with concerns of Sleep Apnea (Had sleep study done).     Past Sleep History  Patient has the following sleep-related diagnoses: obstructive sleep apnea insomnia poor sleep hygiene. Prior sleep study results: significant for DAVION    Current History  On today's visit, the patient reports recent sleep study, here to review and further POC. She reports significant sleep symptoms. She also reports issues with vivid dreams. We reviewed her sleep study in depth today in clinic, including 3% vs 4% scoring. We discussed multiple treatment options today in clinic. We mutually agreed to start with CPAP therapy, potential for Inspire in the future. We reviewed the Resmed Airsense 11 machine and multiple mask styles today in clinic. We reviewed once she gets the machine, insurance compliance requirements as well as 30 day mask guarantees.     SLEEP STUDY HISTORY  PSG - 2023  BMI - 23.3  RDI4% - 16.6/hr  MARCELLO - 0.8/hr  SpO2 mariann - 87%    SLEEP-WAKE SCHEDULE  Bedtime:  10 pm daily  Subjective sleep latency: 30 -60 mins  Difficulty falling asleep: No  due to electronics  Number of awakenings: x1-2 times per night spontaneously for unknown reasons  Falls back asleep in 5 - 10 mins  Difficulty staying asleep: No  due  to nocturia and unknown reasons  Final wake time:  830 am daily  Out of bed time:  830 am daily  Shift work: retired  Naps: denied  Feels rested after a nap: N/A  Average sleep duration (excluding naps): 8 hrs    SLEEP ENVIRONMENT  Sleep location: bed  Sleep status: sleeps with bed partner  Preferred sleep position: Back and Side  TV in bedroom: yes  Room is dark:  Yes  Room is quiet: Yes  Room is cool: Yes  Bed comfort: good    SLEEP HABITS   Clockwatching: No   Smoking: current daily smoker  ETOH: occasionally  Marijuana: CBD  Caffeine: daily  Sleep aids: Advil PM      WEIGHT: stable    Claustrophobia: No     STOP-BAN  ESS:  1  RAYA: 7  PHQ-2:  NEGATIVE SCREEN  FRANSISCO-7: 3      REVIEW OF SYSTEMS     REVIEW OF SYSTEMS  Sleep-related ROS:    Sleep Initiation: Sleep initiation: no problems going to sleep  Problems going to sleep associated with: Prob Falling Asleep: No problems going to sleep    Sleep Maintenance: denies problematic awakenings and easily returns to sleep after awakening  Problems staying asleep associated with: Problems Staying Asleep: nocturia and no clear reason    Breathing during sleep: Breathing during sleep: snoring, witnessed apneas, and gasping/choking for air    Night symptoms: POSITIVE for snoring, witnessed apnea, wake up gasping and/or choking for air, and nocturia  Morning symptoms: POSITIVE for unrefreshing sleep and morning dry mouth  Daytime symptoms POSITIVE for excessive daytime sleepiness, fatigue, trouble remembering things in daytime, and trouble staying focused in daytime  Hypersomnia / narcolepsy symptoms: Patient denies symptoms of a hypersomnolence disorder such as sleep paralysis, sleep-related hallucinations, recurrent sleep attacks, automatic behaviors, and cataplexy.   Parasomnia symptoms: POSITIVE for nightmares  Movements in sleep: Patient denies problematic movements in sleep,     RLS screen:  RLSSCREEN: - Sensations: Patient does not have unusual sensations in their  extremities that cause an urge to move them     Sleep-related behaviors: nightmares    Daytime Symptoms  On awakening patient reports: waking unrefreshed    Patient report some daytime symptoms including: DAYTIME SYMPTOMS: reports excessive daytime sleepiness difficulty with memory or concentration during the day  Patient denies daytime symptoms including: Denies: sleep inertia late to work/school due to sleepiness irritability during the day feeling sleepy when driving    Naps:   No  Fatigue: symptoms bothersome, but easily able to carry out all usual work/school/family activities    All other systems have been reviewed and are negative.      ALLERGIES AND MEDICATIONS     ALLERGIES  No Known Allergies    MEDICATIONS  Current Outpatient Medications   Medication Sig Dispense Refill    amLODIPine (Norvasc) 10 mg tablet Take 1 tablet (10 mg) by mouth once daily. 30 tablet 11    ascorbic acid (Vitamin C) 1,000 mg tablet Take 1 tablet (1,000 mg) by mouth once daily.      aspirin 81 mg EC tablet Take 1 tablet (81 mg) by mouth once daily.      carBAMazepine XR (TEGretol XR) 100 mg 12 hr tablet Take 1 tablet (100 mg) by mouth 2 times a day. Do not crush, chew, or split. 60 tablet 1    cholecalciferol (Vitamin D-3) 50 mcg (2,000 unit) capsule Take 1 capsule (50 mcg) by mouth once daily.      cyanocobalamin, vitamin B-12, (Vitamin B-12) 1,000 mcg tablet extended release Take 1,000 mcg by mouth once daily.      FLUoxetine (PROzac) 20 mg tablet Take 1 tablet (20 mg) by mouth once daily.      gabapentin (Neurontin) 100 mg capsule Take 1 capsule (100 mg) by mouth 3 times a day. 90 capsule 1    Lactobac 42/Bifid 8/colost/FOS (PROBIOTIC PLUS COLOSTRUM ORAL) Take by mouth.      lovastatin (Mevacor) 40 mg tablet Take 1 tablet (40 mg) by mouth once daily at bedtime. (Patient not taking: Reported on 8/7/2023) 30 tablet 11    mv,calcium,min/iron/folic/vitK (MULTI FOR HER ORAL) Take 1 tablet by mouth 1 (one) time each day.       "propranolol (Inderal) 20 mg tablet START W/ 1 TAB BY MOUTH TWICE DAILY FOR 3 WKS, THEN CAN INCREASE TO 1 TAB 3 TIMES DAILY FOR SYMPTOMS (Patient not taking: Reported on 8/7/2023) 90 tablet 5    zinc gluconate-zinc picolinate 30 mg capsule Take by mouth.       No current facility-administered medications for this visit.         PAST HISTORY     PERTINENT PAST MEDICAL HISTORY: See HPI    PERTINENT PAST SURGICAL HISTORY for Sleep Medicine:  non-contributory    PERTINENT FAMILY HISTORY for Sleep Medicine:  Patient denies family history of any sleep disorder.  Patient denies family history of sleep apnea.    PERTINENT SOCIAL HISTORY:  She  reports that she has been smoking cigarettes. She has been exposed to tobacco smoke. She has never used smokeless tobacco. She reports current alcohol use. She reports that she does not currently use drugs after having used the following drugs: Marijuana. She currently lives with a partner or spouse and retired from work.    Active Problems, Allergy List, Medication List, and PMH/PSH/FH/Social Hx have been reviewed and reconciled in chart. No significant changes unless documented in the pertinent chart section. Updates made when necessary.     SLEEP HABITS   Clockwatching: No   Smoking: current daily smoker  ETOH: occasionally  Marijuana: CBD  Caffeine: daily  Sleep aids: Advil PM        PHYSICAL EXAM     VITAL SIGNS: /84   Pulse 77   Ht 1.613 m (5' 3.5\")   Wt 61.7 kg (136 lb 1.6 oz)   SpO2 98%   BMI 23.73 kg/m²     NECK CIRCUMFERENCE: 14 inches    CURRENT WEIGHT:   Vitals:    10/02/23 1320   Weight: 61.7 kg (136 lb 1.6 oz)      BMI: Body mass index is 23.73 kg/m².    PREVIOUS WEIGHTS:  Wt Readings from Last 3 Encounters:   10/02/23 61.7 kg (136 lb 1.6 oz)   08/07/23 61.5 kg (135 lb 9.6 oz)   07/28/23 61.5 kg (135 lb 9.6 oz)       Physical Exam  Constitutional: Awake, not in distress  Lungs: Clear to auscultation bilateral, no rales  Heart: Regular rate and rhythm, no " "murmurs  Skin: Warm, no rash  Neuro: No tremors, moves all extremities  Psych: alert and oriented to time, place, and person      RESULTS/DATA     No results found for: \"IRON\", \"TRANSFERRIN\", \"IRONSAT\", \"TIBC\", \"FERRITIN\"    Bicarbonate   Date Value Ref Range Status   08/09/2023 29 21 - 32 mmol/L Final         ASSESSMENT/PLAN     Assessment/Plan   Nima Chan is a 68 y.o. female presents today in [facility] with the following problems:      OBSTRUCTIVE SLEEP APNEA, moderate-severe (PSG AHI: 16.6/hr)  - Patient's risk factors for DAVION: Age, postmenopause, HTN, use of ETOH, current smoker, and narrow crowded upper airway anatomy  - DAVION diagnosed by PSG in 6/2023. Reviewed sleep studies today in depth in clinic. See HPI.  - We discussed 3% vs 4% scoring. Moderate-severe DAVION dx  - we discussed multiple treatment options including OAT, positional therapy, PAP therapy and Inspire.  - We mutually agreed to start with PAP therapy, plan to start with Resmed n30i mask.   - I recommend starting auto-CPAP 5 -15 cm H2O with EPR 3, heated humidification, heated tubings, and mask fitting session. Orders sent to [facility].   - Discussed about 30-day mask guarantee and insurance requirement for PAP compliance follow-up.   - Supportive management: Lose weight. Stay off your back when sleeping. Avoid smoking, alcohol, and sedating medications if applicable. Don't drive when sleepy.  - Discussed DAVION pathophysiology, diagnostic testing (HST vs PSG), cardiometabolic and neurocognitive sequelae of untreated DAVION, and treatment options (PAP therapy, oral appliance, surgery, hypoglossal nerve stimulator called INSPIRE, etc).    - I had face-to-face discussion with the patient about the need for PAP therapy to treat sleep apnea. Patient agreed with the plan and verbalized understanding.    OVERWEIGHT  - BMI today Body mass index is 23.73 kg/m².   - most recent Bicarb elevated, elevated suspicion for obesity " hypoventilation syndrome  - Encouraged patient to lose weight with diet and exercise.   - Weight loss can help in the long term treatment of DAVION.    VIVID DREAMS  - intermittently has vivid dreams, sometimes mumbles in sleep  - usually not bothersome  - will continue to monitor    SMOKING STATUS + MARIJUANA USE  - current smoker  - not ready to quit  - discussed not smoking at least 2-4 hours prior to bedtime  - marijuana use has no sleep property, long-term use can negatively impact sleep and mental health    All of patient's questions were answered. She verbalizes understanding and agreement with my assessment and plan.

## 2023-10-09 ASSESSMENT — PATIENT HEALTH QUESTIONNAIRE - PHQ9
SUM OF ALL RESPONSES TO PHQ9 QUESTIONS 1 AND 2: 0
2. FEELING DOWN, DEPRESSED OR HOPELESS: NOT AT ALL
1. LITTLE INTEREST OR PLEASURE IN DOING THINGS: NOT AT ALL

## 2023-10-09 ASSESSMENT — SLEEP AND FATIGUE QUESTIONNAIRES
SLEEP_PROBLEM_INTERFERES_DAILY_ACTIVITIES: NOT AT ALL NOTICEABLE
SATISFACTION_WITH_CURRENT_SLEEP_PATTERN: SATISFIED
HOW LIKELY ARE YOU TO NOD OFF OR FALL ASLEEP WHILE WATCHING TV: WOULD NEVER DOZE
HOW LIKELY ARE YOU TO NOD OFF OR FALL ASLEEP WHILE SITTING QUIETLY AFTER LUNCH WITHOUT ALCOHOL: WOULD NEVER DOZE
SITING INACTIVE IN A PUBLIC PLACE LIKE A CLASS ROOM OR A MOVIE THEATER: WOULD NEVER DOZE
HOW LIKELY ARE YOU TO NOD OFF OR FALL ASLEEP WHILE SITTING AND READING: WOULD NEVER DOZE
SLEEP_PROBLEM_NOTICEABLE_TO_OTHERS: MUCH
HOW LIKELY ARE YOU TO NOD OFF OR FALL ASLEEP WHILE SITTING AND TALKING TO SOMEONE: WOULD NEVER DOZE
WORRIED_DISTRESSED_DUE_TO_SLEEP: A LITTLE
HOW LIKELY ARE YOU TO NOD OFF OR FALL ASLEEP IN A CAR, WHILE STOPPED FOR A FEW MINUTES IN TRAFFIC: WOULD NEVER DOZE
ESS-CHAD TOTAL SCORE: 1
HOW LIKELY ARE YOU TO NOD OFF OR FALL ASLEEP WHILE LYING DOWN TO REST IN THE AFTERNOON WHEN CIRCUMSTANCES PERMIT: SLIGHT CHANCE OF DOZING
HOW LIKELY ARE YOU TO NOD OFF OR FALL ASLEEP WHEN YOU ARE A PASSENGER IN A CAR FOR AN HOUR WITHOUT A BREAK: WOULD NEVER DOZE
DIFFICULTY_FALLING_ASLEEP: MILD

## 2023-10-09 ASSESSMENT — ANXIETY QUESTIONNAIRES
6. BECOMING EASILY ANNOYED OR IRRITABLE: NOT AT ALL
2. NOT BEING ABLE TO STOP OR CONTROL WORRYING: SEVERAL DAYS
GAD7 TOTAL SCORE: 3
1. FEELING NERVOUS, ANXIOUS, OR ON EDGE: NOT AT ALL
4. TROUBLE RELAXING: SEVERAL DAYS
3. WORRYING TOO MUCH ABOUT DIFFERENT THINGS: SEVERAL DAYS
7. FEELING AFRAID AS IF SOMETHING AWFUL MIGHT HAPPEN: NOT AT ALL
5. BEING SO RESTLESS THAT IT IS HARD TO SIT STILL: NOT AT ALL

## 2023-10-10 PROBLEM — F12.90 MARIJUANA USE: Status: ACTIVE | Noted: 2023-10-10

## 2023-10-10 PROBLEM — R68.89 VIVID DREAM: Status: ACTIVE | Noted: 2023-10-10

## 2023-10-10 PROBLEM — F17.200 SMOKER: Status: ACTIVE | Noted: 2023-10-10

## 2023-10-10 PROBLEM — E66.3 OVERWEIGHT: Status: ACTIVE | Noted: 2023-10-10

## 2023-10-10 PROBLEM — G47.33 OSA (OBSTRUCTIVE SLEEP APNEA): Status: ACTIVE | Noted: 2023-10-10

## 2023-10-14 DIAGNOSIS — E87.1 HYPONATREMIA: ICD-10-CM

## 2023-10-16 DIAGNOSIS — R25.1 TREMOR OF BOTH HANDS: ICD-10-CM

## 2023-10-16 RX ORDER — PROPRANOLOL HYDROCHLORIDE 20 MG/1
TABLET ORAL
Qty: 270 TABLET | Refills: 1 | Status: SHIPPED | OUTPATIENT
Start: 2023-10-16 | End: 2024-06-07

## 2023-10-16 RX ORDER — SODIUM CHLORIDE 1 G/1
1 TABLET ORAL DAILY
Qty: 30 TABLET | Refills: 0 | Status: SHIPPED | OUTPATIENT
Start: 2023-10-16 | End: 2023-12-12 | Stop reason: SDUPTHER

## 2023-10-19 NOTE — PROGRESS NOTES
HPI  Seizure: She had another episode in February. She states that she was not started on medication for seizures at the time. Saw neuro last week in [place], Dr. Mckinney. Had testing that took over 3 hours. Challenging at times for her.   She has an appointment in August 23rd for Neuro psych. He is going to go over the testing with her. Not taking Keppra, no medications from neurology.      Hand tremors: More in the right hand. Still bothering her. She noticed improvement with propanolol. Would like to continue current dose.      Poor short term memory: started about 6 months or so ago. She denies family noticing. [place] and she could not remember where the [employer] was on [facility]. She is having issues with retaining information.      Hemorrhagic stroke: she had it beginning of 2004, she was in ICU at [facility]. She had a neurosurgeon Dr. Villarreal (he put an electrode in someone that had teretes). She was told by him that there was no physical reason for her stroke, She was put on Dilantin. She was not allowed to drink with it so she declined.     Elevated platelets: Following with Dr. Neal.     All other systems reviewed and negative for complaint unless stated above.      Patient Care Team:  DANIELLE Preciado-CNP as PCP - General  Elizabeth Suero DO as PCP - [employer] PCP     Assessment/Plan   Problem List Items Addressed This Visit    None         #Elevated platelets  followup with heme/onc  suspect myeloproliferative disorder  asymptomatic, monitor     #Memory difficulties  encouraged socialization  MOCA test 26/30 in September 2022  Will repeat at follow up in October 2024  Neuro cleared for short distances      #HTN   continue amlodipine  Stress test showed occasional PVCs   continue asa      #Depression  Continue fluoxetine     #Hand Tremor   Propranolol is working well   Continue   May need to increase frequency/dose in future

## 2023-10-24 ENCOUNTER — OFFICE VISIT (OUTPATIENT)
Dept: PRIMARY CARE | Facility: CLINIC | Age: 68
End: 2023-10-24
Payer: MEDICARE

## 2023-10-24 ENCOUNTER — TELEPHONE (OUTPATIENT)
Dept: PRIMARY CARE | Facility: CLINIC | Age: 68
End: 2023-10-24

## 2023-10-24 ENCOUNTER — APPOINTMENT (OUTPATIENT)
Dept: PRIMARY CARE | Facility: CLINIC | Age: 68
End: 2023-10-24
Payer: MEDICARE

## 2023-10-24 VITALS
WEIGHT: 135.8 LBS | BODY MASS INDEX: 23.18 KG/M2 | SYSTOLIC BLOOD PRESSURE: 163 MMHG | HEIGHT: 64 IN | DIASTOLIC BLOOD PRESSURE: 88 MMHG

## 2023-10-24 DIAGNOSIS — E87.8 ELECTROLYTE IMBALANCE: ICD-10-CM

## 2023-10-24 DIAGNOSIS — R42 DIZZINESS: Primary | ICD-10-CM

## 2023-10-24 DIAGNOSIS — E87.1 HYPONATREMIA: ICD-10-CM

## 2023-10-24 PROCEDURE — 3077F SYST BP >= 140 MM HG: CPT | Performed by: REGISTERED NURSE

## 2023-10-24 PROCEDURE — 1160F RVW MEDS BY RX/DR IN RCRD: CPT | Performed by: REGISTERED NURSE

## 2023-10-24 PROCEDURE — 1159F MED LIST DOCD IN RCRD: CPT | Performed by: REGISTERED NURSE

## 2023-10-24 PROCEDURE — 1126F AMNT PAIN NOTED NONE PRSNT: CPT | Performed by: REGISTERED NURSE

## 2023-10-24 PROCEDURE — 99214 OFFICE O/P EST MOD 30 MIN: CPT | Performed by: REGISTERED NURSE

## 2023-10-24 PROCEDURE — 3079F DIAST BP 80-89 MM HG: CPT | Performed by: REGISTERED NURSE

## 2023-10-24 ASSESSMENT — ENCOUNTER SYMPTOMS
RHINORRHEA: 0
FEVER: 0
CHILLS: 0
CONFUSION: 0
COUGH: 0
ABDOMINAL PAIN: 0
WEAKNESS: 0
EYE REDNESS: 0
HEADACHES: 0
VOMITING: 0
CONSTIPATION: 0
WOUND: 0
WHEEZING: 0
DIFFICULTY URINATING: 0
NERVOUS/ANXIOUS: 0
DIZZINESS: 0
EYE DISCHARGE: 0
NAUSEA: 0
FREQUENCY: 0
SHORTNESS OF BREATH: 0
DIARRHEA: 0

## 2023-10-24 NOTE — TELEPHONE ENCOUNTER
That is an anxiety and depression medication. If she would like to stop it she would have to come off of this slowly. I would cut the tablets in half and do that for a week and then trial coming off the medication to see if this helps with the dizziness

## 2023-10-24 NOTE — TELEPHONE ENCOUNTER
Nima called, she is on Fluoxetine 20mg which she stats makes her feel dizzy after she takes it. She is wanting to know why she is taking this?

## 2023-10-24 NOTE — PROGRESS NOTES
Subjective   Patient ID: Nima Chan is a 68 y.o. female who presents for Follow-up (Discuss medication; no concerns at this time;).    HPI     Seizure: She had another episode in February. She states that she was not started on medication for seizures at the time. Saw neuro last week in [place], Dr. Mckinney. Had testing that took over 3 hours. Challenging at times for her.   She has an appointment in August 23rd for Neuro psych. He is going to go over the testing with her. Not taking Keppra, no medications from neurology.     No recent seizures     ETOH use: She continues to drink 4 beers per night.     Vertigo: She is having dizziness with position changes. She has meclazine at home.     Hyponatremia: She has still been taking her sodium tablets. This was thought to be a cause of her seizures. She has not had blood work done to eval for SIADH     Hand tremors: More in the right hand. Still bothering her. She noticed improvement with propanolol. Would like to continue current dose.      Poor short term memory: started about 6 months or so ago. She denies family noticing. [place] and she could not remember where the [email or web address] was on [facility]. She is having issues with retaining information.   She was evaluated with neuropsych and they found no cognitive decline.      Hemorrhagic stroke: she had it beginning of 2004, she was in ICU at [facility]. She had a neurosurgeon Dr. Villarreal (he put an electrode in someone that had teretes). She was told by him that there was no physical reason for her stroke, She was put on Dilantin. She was not allowed to drink with it so she declined.     Elevated platelets: Following with Dr. Neal.     All other systems reviewed and negative for complaint unless stated above.      Review of Systems   Constitutional:  Negative for chills and fever.   HENT:  Negative for congestion, ear pain and rhinorrhea.    Eyes:  Negative for discharge and redness.  "  Respiratory:  Negative for cough, shortness of breath and wheezing.    Cardiovascular:  Negative for chest pain and leg swelling.   Gastrointestinal:  Negative for abdominal pain, constipation, diarrhea, nausea and vomiting.   Genitourinary:  Negative for difficulty urinating, frequency and urgency.   Musculoskeletal:  Negative for gait problem.   Skin:  Negative for rash and wound.   Neurological:  Negative for dizziness, weakness and headaches.   Psychiatric/Behavioral:  Negative for confusion. The patient is not nervous/anxious.        Objective   /88 (BP Location: Right arm, Patient Position: Sitting, BP Cuff Size: Adult)   Ht 1.613 m (5' 3.5\")   Wt 61.6 kg (135 lb 12.8 oz)   BMI 23.68 kg/m²     Physical Exam  Vitals reviewed.   Constitutional:       Appearance: Normal appearance.   HENT:      Head: Normocephalic.      Right Ear: Tympanic membrane, ear canal and external ear normal.      Left Ear: Tympanic membrane, ear canal and external ear normal.      Nose: No rhinorrhea.      Mouth/Throat:      Mouth: Mucous membranes are moist.      Pharynx: Oropharynx is clear.   Eyes:      Pupils: Pupils are equal, round, and reactive to light.   Cardiovascular:      Rate and Rhythm: Normal rate and regular rhythm.      Pulses: Normal pulses.   Pulmonary:      Effort: Pulmonary effort is normal.      Breath sounds: Normal breath sounds.   Abdominal:      General: Abdomen is flat. Bowel sounds are normal.      Palpations: Abdomen is soft.   Musculoskeletal:         General: No tenderness. Normal range of motion.      Right lower leg: No edema.      Left lower leg: No edema.   Lymphadenopathy:      Cervical: No cervical adenopathy.   Skin:     General: Skin is warm and dry.      Findings: No rash.   Neurological:      Mental Status: She is alert and oriented to person, place, and time.   Psychiatric:         Mood and Affect: Mood normal.         Behavior: Behavior normal.       Assessment/Plan           #Etoh " use   #Seizures  Continue to abstain from etoh!!   Follow up with neuro on 8/23  Continue gabapentin and carbamazepine   Discussed cutting back on marijuana use    #Hyponatremia   Blood work ordered  Checking for SIADH   Continue sodium tablets      #Elevated platelets  followup with heme/onc  suspect myeloproliferative disorder  asymptomatic, monitor     #Memory difficulties  encouraged socialization  MOCA test 26/30 in September 2022  Will repeat at follow up in October 2024  Neuro cleared for short distances      #HTN   continue amlodipine  Stress test showed occasional PVCs   continue asa      #Depression  Continue fluoxetine     #Hand Tremor   Propranolol is working well   Continue   May need to increase frequency/dose in future          #HCM  C-scope next due 2025  UTD for covid, declining flu and PNA vaccines   Mammogram in 2020, declines today

## 2023-10-31 ENCOUNTER — PATIENT OUTREACH (OUTPATIENT)
Dept: PRIMARY CARE | Facility: CLINIC | Age: 68
End: 2023-10-31
Payer: MEDICARE

## 2023-10-31 NOTE — PROGRESS NOTES
Patient has met target of no readmission for (90) days post hospital discharge and is graduated from Transitional Care Management program at this time.  Nikki Wakefield LPN

## 2023-11-06 ENCOUNTER — APPOINTMENT (OUTPATIENT)
Dept: PRIMARY CARE | Facility: CLINIC | Age: 68
End: 2023-11-06
Payer: MEDICARE

## 2023-11-16 DIAGNOSIS — R56.9 SEIZURE (MULTI): ICD-10-CM

## 2023-11-16 RX ORDER — CARBAMAZEPINE 100 MG/1
100 TABLET, EXTENDED RELEASE ORAL 2 TIMES DAILY
Qty: 60 TABLET | Refills: 1 | Status: SHIPPED | OUTPATIENT
Start: 2023-11-16 | End: 2024-01-08

## 2023-11-16 RX ORDER — GABAPENTIN 100 MG/1
100 CAPSULE ORAL 3 TIMES DAILY
Qty: 90 CAPSULE | Refills: 1 | Status: SHIPPED | OUTPATIENT
Start: 2023-11-16 | End: 2024-01-08

## 2023-11-27 ENCOUNTER — OFFICE VISIT (OUTPATIENT)
Dept: SLEEP MEDICINE | Facility: CLINIC | Age: 68
End: 2023-11-27
Payer: MEDICARE

## 2023-11-27 VITALS
SYSTOLIC BLOOD PRESSURE: 168 MMHG | BODY MASS INDEX: 24.1 KG/M2 | HEIGHT: 63 IN | OXYGEN SATURATION: 98 % | WEIGHT: 136 LBS | HEART RATE: 83 BPM | DIASTOLIC BLOOD PRESSURE: 87 MMHG

## 2023-11-27 DIAGNOSIS — R68.89 VIVID DREAM: ICD-10-CM

## 2023-11-27 DIAGNOSIS — F17.200 SMOKER: ICD-10-CM

## 2023-11-27 DIAGNOSIS — Z78.9 ALCOHOL USE: ICD-10-CM

## 2023-11-27 DIAGNOSIS — G47.33 OSA (OBSTRUCTIVE SLEEP APNEA): Primary | ICD-10-CM

## 2023-11-27 DIAGNOSIS — I10 PRIMARY HYPERTENSION: ICD-10-CM

## 2023-11-27 DIAGNOSIS — Z13.31 POSITIVE SCREENING FOR DEPRESSION ON 2-ITEM PATIENT HEALTH QUESTIONNAIRE (PHQ-2): ICD-10-CM

## 2023-11-27 DIAGNOSIS — F12.90 MARIJUANA USE: ICD-10-CM

## 2023-11-27 PROBLEM — F10.90 ALCOHOL USE DISORDER: Status: ACTIVE | Noted: 2023-11-27

## 2023-11-27 PROBLEM — R41.89 COGNITIVE CHANGES: Status: ACTIVE | Noted: 2023-11-27

## 2023-11-27 PROBLEM — R41.81 AGE-RELATED COGNITIVE DECLINE: Status: ACTIVE | Noted: 2023-11-27

## 2023-11-27 PROCEDURE — 1126F AMNT PAIN NOTED NONE PRSNT: CPT

## 2023-11-27 PROCEDURE — 1159F MED LIST DOCD IN RCRD: CPT

## 2023-11-27 PROCEDURE — 3008F BODY MASS INDEX DOCD: CPT

## 2023-11-27 PROCEDURE — 99213 OFFICE O/P EST LOW 20 MIN: CPT

## 2023-11-27 PROCEDURE — 1160F RVW MEDS BY RX/DR IN RCRD: CPT

## 2023-11-27 PROCEDURE — 3077F SYST BP >= 140 MM HG: CPT

## 2023-11-27 PROCEDURE — 3079F DIAST BP 80-89 MM HG: CPT

## 2023-11-27 ASSESSMENT — ANXIETY QUESTIONNAIRES
GAD7 TOTAL SCORE: 3
3. WORRYING TOO MUCH ABOUT DIFFERENT THINGS: SEVERAL DAYS
7. FEELING AFRAID AS IF SOMETHING AWFUL MIGHT HAPPEN: NOT AT ALL
1. FEELING NERVOUS, ANXIOUS, OR ON EDGE: SEVERAL DAYS
4. TROUBLE RELAXING: NOT AT ALL
6. BECOMING EASILY ANNOYED OR IRRITABLE: NOT AT ALL
2. NOT BEING ABLE TO STOP OR CONTROL WORRYING: SEVERAL DAYS
5. BEING SO RESTLESS THAT IT IS HARD TO SIT STILL: NOT AT ALL

## 2023-11-27 ASSESSMENT — SLEEP AND FATIGUE QUESTIONNAIRES
HOW LIKELY ARE YOU TO NOD OFF OR FALL ASLEEP WHILE SITTING QUIETLY AFTER LUNCH WITHOUT ALCOHOL: WOULD NEVER DOZE
HOW LIKELY ARE YOU TO NOD OFF OR FALL ASLEEP WHILE SITTING AND TALKING TO SOMEONE: WOULD NEVER DOZE
HOW LIKELY ARE YOU TO NOD OFF OR FALL ASLEEP WHEN YOU ARE A PASSENGER IN A CAR FOR AN HOUR WITHOUT A BREAK: WOULD NEVER DOZE
SLEEP_PROBLEM_NOTICEABLE_TO_OTHERS: A LITTLE
DIFFICULTY_STAYING_ASLEEP: MILD
WORRIED_DISTRESSED_DUE_TO_SLEEP: NOT AT ALL NOTICEABLE
SATISFACTION_WITH_CURRENT_SLEEP_PATTERN: VERY SATISFIED
HOW LIKELY ARE YOU TO NOD OFF OR FALL ASLEEP WHILE SITTING AND READING: WOULD NEVER DOZE
HOW LIKELY ARE YOU TO NOD OFF OR FALL ASLEEP WHILE LYING DOWN TO REST IN THE AFTERNOON WHEN CIRCUMSTANCES PERMIT: MODERATE CHANCE OF DOZING
SLEEP_PROBLEM_INTERFERES_DAILY_ACTIVITIES: NOT AT ALL NOTICEABLE
ESS-CHAD TOTAL SCORE: 3
SITING INACTIVE IN A PUBLIC PLACE LIKE A CLASS ROOM OR A MOVIE THEATER: WOULD NEVER DOZE
HOW LIKELY ARE YOU TO NOD OFF OR FALL ASLEEP WHILE WATCHING TV: SLIGHT CHANCE OF DOZING
HOW LIKELY ARE YOU TO NOD OFF OR FALL ASLEEP IN A CAR, WHILE STOPPED FOR A FEW MINUTES IN TRAFFIC: WOULD NEVER DOZE

## 2023-11-27 ASSESSMENT — PATIENT HEALTH QUESTIONNAIRE - PHQ9
SUM OF ALL RESPONSES TO PHQ9 QUESTIONS 1 AND 2: 2
2. FEELING DOWN, DEPRESSED OR HOPELESS: SEVERAL DAYS
1. LITTLE INTEREST OR PLEASURE IN DOING THINGS: SEVERAL DAYS

## 2023-11-27 NOTE — PATIENT INSTRUCTIONS
It was a pleasure meeting you today Nima Chan     As we discussed today in clinic:    1. Continue with your current CPAP setting. Remember to try and use as much as possible. You are off to GREAT start!!! Keep up the good work.   2. Encouraged to lose weight.   3. Remember, don't drive when sleepy.   4. Stay off your back when sleeping.  5. Your BP was elevated today in clinic - monitor your BP, if continually elevated or your experience any lightheaded, dizziness, CP, headache or concerning symptoms notify your PCP or go to the nearest ER for evaluation         As a general guideline, please replace your: PAP cushions every 2-4 weeks, mask every 3-6 months, hose every 3-6 months, Filter (disposable) every 2-4 weeks; machine may need replacement in 5-10 years (once they stop working).     Education handouts given: Sleep Hygiene / PAP Handouts / Cleaning Schedule    Follow-up: 1 - 2 months    ALWAYS BRING YOUR CPAP / BIPAP WITH YOU TO EVERY APPOINTMENT!  THANKS    FOR QUESTIONS AND CONCERNS:   1. In case of problems with machine or mask interface, please contact your DME company first. DME is the company that provides you the machine and/or CPAP supplies. If [employer] if your DME, you can reach them at [phone] or [employer] [phone].  2. For SLEEP STUDY appointments, please call [phone] ([place]) or [phone] / [phone] ([facility]) or any other  Sleep Lab location please call [phone]  3. For MEDICAL QUESTIONS, MEDICATION REFILLS, or CLINIC APPOINTMENT SCHEDULING, please call [phone] and my practice lead [place] would gladly assist you with any concerns.   4. In the event that you are running more than 10 minutes late to your appointment or 5 minutes to virtual, I will kindly ask you to reschedule.    Here at [facility], we wish you a restful sleep!

## 2023-11-27 NOTE — PROGRESS NOTES
Patient: Nima Chan  : 1955 AGE: 68 y.o. SEX:female   MRN: [id number]   Provider: RENARD Ramirez     Location [facility]   Service Date: 2023     PCP: RENARD Preciado   Referred by: No ref. provider found              [facility]  FOLLOW-UP VISIT NOTE      HISTORY OF PRESENT ILLNESS     Patient ID: Nima Chan is a 68 y.o. female who presents to a [facility] for follow-up of DAVION after new machine setup.     Patient is here alone today.  The patient has pertinent hx of DAVION, vivid dreams, normal BMI, HTN, Mitral valve prolapse, stroke, current smoker, marijuana use, alcohol use, depression, convulsions, & memory difficulties.     Previous Visit's:  10/2/2023  On today's visit, the patient reports recent sleep study, here to review and further POC. She reports significant sleep symptoms. She also reports issues with vivid dreams. We reviewed her sleep study in depth today in clinic, including 3% vs 4% scoring. We discussed multiple treatment options today in clinic. We mutually agreed to start with CPAP therapy, potential for Inspire in the future. We reviewed the Resmed Airsense 11 machine and multiple mask styles today in clinic. We reviewed once she gets the machine, insurance compliance requirements as well as 30 day mask guarantees.       Interval History  Patient was last seen in 10/2/2023.   23   Since last visit, patient has been using machine every night but not getting to 4 hours usage due to lack of perceived benefits.  Complains of just not motivated every night with machine.  The following are patient's perceived benefits of PAP: Patient denies any perceived benefits from using PAP. Patient states there was no difference or improvement of symptoms with or without CPAP..  She initially struggled with the CPAP when she first started, she only had gotten the sleep study  at the urge of brother. She has slowly gotten better with adherence to the PAP machine and mask. She does not notice a significant difference but there is minimal improvement with sleep. We discussed that we treat to reduce risk factors as well as for symptoms management. With more usage and consistency she will likely see an improvement with symptoms. Dreaming is not as vivid, doing a little better.      SLEEP HISTORY     SLEEP STUDY HISTORY  PSG - 6/25/2023  BMI - 23.3  RDI4% - 16.6/hr  MARCELLO - 0.8/hr  SpO2 mariann - 87%    SLEEP-WAKE SCHEDULE  Bedtime:  10 pm daily  Subjective sleep latency: 30 -60 mins  Difficulty falling asleep: No  due to electronics  Number of awakenings: x1-2 times per night spontaneously for unknown reasons  Falls back asleep in 5 - 10 mins  Difficulty staying asleep: No  due to nocturia and unknown reasons  Final wake time:  830 am daily  Out of bed time:  830 am daily  Shift work: retired  Naps: denied  Feels rested after a nap: N/A  Average sleep duration (excluding naps): 8 hrs    SLEEP ENVIRONMENT  Sleep location: bed  Sleep status: sleeps with bed partner  Preferred sleep position: Back and Side  TV in bedroom: yes  Room is dark:  Yes  Room is quiet: Yes  Room is cool: Yes  Bed comfort: good    SLEEP HABITS   Activities before bedtime: TV  Activities in bed: TV  Clockwatching: No   Smoking: current daily smoker  ETOH: occasionally  Marijuana: CBD  Caffeine: daily  Sleep aids: Advil PM    WEIGHT: stable    Claustrophobia: No     REVIEW OF SYSTEMS     REVIEW OF SYSTEMS  SLEEP ROS   Night symptoms: POSITIVE for snoring, witnessed apnea, wake up gasping and/or choking for air, and nocturia and NEGATIVE for nasal congestion and/or post nasal drip, mouth breathing, night sweats during sleep, waking up with racing heart , heartburn or sour taste in mouth at night, and nocturnal cough  Morning symptoms: POSITIVE for unrefreshing sleep and morning dry mouth and NEGATIVE for morning headache,  morning sore throat, and sleep inertia  Daytime symptoms POSITIVE for excessive daytime sleepiness, fatigue, trouble remembering things in daytime, and trouble staying focused in daytime and NEGATIVE for irritability in daytime and drowsy driving  Hypersomnia / narcolepsy symptoms: Patient denies symptoms of a hypersomnolence disorder such as sleep paralysis, sleep-related hallucinations, recurrent sleep attacks, automatic behaviors, and cataplexy.   Parasomnia symptoms: POSITIVE for nightmares  Movements in sleep: Patient denies problematic movements in sleep,     RLS screen: RLSSCREEN: - Sensations: Patient does not have unusual sensations in their extremities that cause an urge to move them   - Movement: Patient has not been told that their legs kick or jerk during sleep    All other systems have been reviewed and are negative.      ALLERGIES     No Known Allergies    MEDICATIONS     Current Outpatient Medications   Medication Sig Dispense Refill    amLODIPine (Norvasc) 10 mg tablet Take 1 tablet (10 mg) by mouth once daily. 30 tablet 11    ascorbic acid (Vitamin C) 1,000 mg tablet Take 1 tablet (1,000 mg) by mouth once daily.      aspirin 81 mg EC tablet Take 1 tablet (81 mg) by mouth once daily.      carBAMazepine XR (TEGretol XR) 100 mg 12 hr tablet TAKE 1 TABLET (100 MG) BY MOUTH 2 TIMES A DAY. DO NOT CRUSH, CHEW, OR SPLIT. 60 tablet 1    cholecalciferol (Vitamin D-3) 50 mcg (2,000 unit) capsule Take 1 capsule (50 mcg) by mouth once daily.      cyanocobalamin, vitamin B-12, (Vitamin B-12) 1,000 mcg tablet extended release Take 1 tablet (1,000 mcg) by mouth once daily.      FLUoxetine (PROzac) 20 mg tablet Take 1 tablet (20 mg) by mouth once daily.      gabapentin (Neurontin) 100 mg capsule TAKE 1 CAPSULE BY MOUTH THREE TIMES A DAY 90 capsule 1    Lactobac 42/Bifid 8/colost/FOS (PROBIOTIC PLUS COLOSTRUM ORAL) Take by mouth.      mv,calcium,min/iron/folic/vitK (MULTI FOR HER ORAL) Take 1 tablet by mouth 1 (one)  "time each day.      propranolol (Inderal) 20 mg tablet START W/ 1 TAB BY MOUTH TWICE DAILY FOR 3 WKS, THEN CAN INCREASE TO 1 TAB 3 TIMES DAILY FOR SYMPTOMS 270 tablet 1    zinc gluconate-zinc picolinate 30 mg capsule Take by mouth.      sodium chloride 1,000 mg tablet TAKE 1 TABLET (1 GRAM) BY MOUTH ONCE DAILY (Patient not taking: Reported on 2023) 30 tablet 0     No current facility-administered medications for this visit.       PAST HISTORY     PERTINENT PAST MEDICAL HISTORY: See HPI    PERTINENT PAST SURGICAL HISTORY for Sleep Medicine:  non-contributory    PERTINENT FAMILY HISTORY for Sleep Medicine:  Patient denies family history of any sleep disorder.  Patient denies family history of sleep apnea.    PERTINENT SOCIAL HISTORY:  She  reports that she has been smoking cigarettes. She has been exposed to tobacco smoke. She has never used smokeless tobacco. She reports current alcohol use. She reports that she does not currently use drugs after having used the following drugs: Marijuana. She currently lives alone and retired from work.     Active Problems, Allergy List, Medication List, and PMH/PSH/FH/Social Hx have been reviewed and reconciled in chart. No significant changes unless documented in the pertinent chart section. Updates made when necessary.     PHYSICAL EXAM     VITAL SIGNS: /87 (BP Location: Right arm, Patient Position: Sitting, BP Cuff Size: Large adult)   Pulse 83   Ht 1.6 m (5' 2.99\")   Wt 61.7 kg (136 lb)   SpO2 98%   BMI 24.10 kg/m²     NECK CIRCUMFERENCE: 14 inches    CURRENT WEIGHT:   Vitals:    23 1307   Weight: 61.7 kg (136 lb)      BMI: Body mass index is 24.1 kg/m².     PREVIOUS WEIGHTS:  Wt Readings from Last 3 Encounters:   23 61.7 kg (136 lb)   10/24/23 61.6 kg (135 lb 12.8 oz)   10/02/23 61.7 kg (136 lb 1.6 oz)       STOP-BAN    Today ESS: 3  Last visit ESS: 1    Today RAYA: 2  Last visit RAYA: 7    Today PHQ-2: POSITIVE  little interest or pleasure = " "1  down, depressed or hopeless = 1  Last visit PHQ-2: NEGATIVE SCREEN     Today FRANSISCO-7: 3  Last visit FRANSISCO-7: 3    Physical Exam  Constitutional: Awake, not in distress  Lungs: Clear to auscultation bilateral, no cough noted  Heart: Regular rate and rhythm  Skin: Warm, no visible rashes  Neuro: No tremors, moves all extremities  Psych: Alert and oriented to time, place, and person    ENT: Modified Mallampati score - III         RESULTS/DATA     No results found for: \"IRON\", \"TRANSFERRIN\", \"IRONSAT\", \"TIBC\", \"FERRITIN\"    Bicarbonate   Date Value Ref Range Status   08/09/2023 29 21 - 32 mmol/L Final       PAP Adherence  A PAP adherence download was obtained and data was reviewed personally today in clinic.        ASSESSMENT/PLAN     Assessment/Plan   Nima Chan is a 68 y.o. female presents today in [facility] with the following problems:    CURRENT DME: [facility]       OBSTRUCTIVE SLEEP APNEA, moderate-severe (PSG AHI: 16.6/hr)   currently on auto-CPAP 5 - 15 cm H2O and EPR 3   Okay compliance to PAP therapy, residual AHI elevated (most is unknown) and good control of DAVION symptoms when usisng  - Patient's risk factors for DAVION: Age, postmenopause, HTN, use of ETOH, current smoker, and narrow crowded upper airway anatomy   - DAVION diagnosed by PSG in 6/2023. Reviewed sleep studies previously in clinic. See HPI.  - Retrieved and personally reviewed recent PAP adherence download data today. See HPI.  - Continue current PAP settings.   - congratulated on patient effort to continue to use CPAP, she reported rough start but is getting more used to CPAP, attributed non-compliance to lack of motivation for treatment  - will follow-up in another month or two for continued compliance      NORMAL BMI  - BMI today Body mass index is 24.1 kg/m².   - encouraged patient to maintain healthy weight  - Weight loss can help in the long term treatment of DAVION.    HYPERTENSION  - BP today 168/87  - denies any " headache, blurry vision, chest pain, palpitation, dizziness, lightheadedness, or syncopal episodes  - encouraged daily exercise with healthy diet for BP and DAVION management  - Defer management to PCP    VIVID DREAMS  - intermittently has vivid dreams, sometimes mumbles in sleep  - usually not bothersome  - will continue to monitor  11/27/23  - minimal reported vivid dream  - no bothersome to patient, has not noticed significant difference yet with PAP      SMOKING STATUS + MARIJUANA USE + ALCOHOL USE  - current smoker  - not ready to quit  - discussed not smoking at least 2-4 hours prior to bedtime  - marijuana use has no sleep property, long-term use can negatively impact sleep and mental health  11/27/23  - continues with smoking, CBD use and alcohol use.  - not ready to quit  - encouraged patient smoking cessation at least 3 hrs  - limit alcohol intake to no later than dinner    DEPRESSION / ANXIETY  + screen  - denies any SI, HI or hallucinations   - encourage patient to establish with mental health team, declined referral  - defer management to PCP       All of patient's questions were answered. She verbalizes understanding and agreement with my assessment and plan.

## 2023-11-27 NOTE — LETTER
Frequency of replacement of CPAP / BIPAP supplies as per Medicare guidelines  (This frequency of replacement can be different with private insurances or Medicaid)    Nasal mask, full face mask, tubing, heated tubing - 1 per 3 months  Headgear, water chamber, chin strap, reusable filter - 1 per 6 months  Disposable filter, replacement cushion, nasal pillows - 2 per month  Replacement cushion / liner for interface - 1 per month    CPAP / BIPAP Cleaning Recommendations    There are several specialized CPAP cleaning machines on the market. None of them are as good as soap and water. The only thing that you need to clean daily is the part of the mask that contacts your skin also known as the mask insert. This mask insert needs to be cleaned with soap and water daily. Another option is to use baby wipes daily.     The rest of the mask, the water chamber, and the tubing should be cleaned at least once a week.    The water tank needs to be filled with distilled water to prevent buildup of white deposits in the future. If you cannot find distilled water, you can use tap water but expect to have white deposits buildup seen after prolonged use with tap water. If you start seeing white deposits on the water tank, you can clean it by filling it with equal parts of distilled white vinegar and water. Let the vinegar-water mixture sit for 2 hours, and then rinse it with running tap water. Clean with soap and water then let it dry.       RULES FOR BETTER SLEEP HYGIENE    Clock Watching -Looking at the clock in the middle of the night only leads to more worry about sleep and leads to longer periods of wakefulness.    Lighting -Keep the bedroom dark as possible especially in the morning as the sun comes up. Room darkening shades or curtains can help, but a simple solution is to wear a sleep mask. Low lighting such as night lights, TV, and book lights are OK.    Noise - A quiet bedroom is preferable. Irregular noises in the bedroom,  even quiet ones, can be disruptive to sleep. White noise such as the sound of a fan or humidifier, can drown out other more disruptive noises leading to less broken sleep. Ear plugs can also be helpful.    Pets -Keep pets off the bed. Their movement on the bed can lead to increased awakenings. It is OK to have pets in the bedroom, but keep them on the floor or their own bed. Consider removing collars or tags that jingle.    Meals - Avoid heavy meals close to bedtime Hunger can disturb your sleep. Therefore, a light snack can be helpful before bedtime. Carbohydrates (i.e., crackers, bread, cereal, fruit) are best for a good night's sleep.    Liquids - A full bladder is likely to lead to sleep disruption in the middle of the night. Try to cut down on the amount of fluid consumed before bedtime. Try to drink smaller amounts in the evening. Try to drink no more than 4-6 oz. in the last 4 hours before bedtime. Everyone is different, so experiment with the timing of liquid reduction for best results.    Exercise -Do not exercise to try to get to sleep. Sometimes exercise too close to bedtime can be overly stimulating but this can vary from person to person. Sometimes exercise in the evening can help keep you alert until bedtime and lead to deeper sleeping. Lawson with the timing of exercise in the evening for the best results.    Alcohol - Although alcohol use before bedtime can help some people to fall asleep more easily, it has been shown to result in more fragmented sleep and more awakening during the night.    Nicotine -Nicotine is also a stimulant, and it has been demonstrated that chronic cigarette smokers have experienced significantly improved sleep when they quit.    Buffer Zone -It helps to set aside the last hour of the evening before bed for quiet relaxing activities. Try not to work or engage in agitating activities close to bedtime.

## 2023-12-05 ENCOUNTER — LAB (OUTPATIENT)
Dept: LAB | Facility: LAB | Age: 68
End: 2023-12-05
Payer: MEDICARE

## 2023-12-05 DIAGNOSIS — E87.8 ELECTROLYTE IMBALANCE: ICD-10-CM

## 2023-12-05 DIAGNOSIS — R42 DIZZINESS: ICD-10-CM

## 2023-12-05 DIAGNOSIS — E87.1 HYPONATREMIA: ICD-10-CM

## 2023-12-05 LAB
ALBUMIN SERPL BCP-MCNC: 4.9 G/DL (ref 3.4–5)
ALP SERPL-CCNC: 77 U/L (ref 33–136)
ALT SERPL W P-5'-P-CCNC: 13 U/L (ref 7–45)
ANION GAP SERPL CALC-SCNC: 13 MMOL/L (ref 10–20)
AST SERPL W P-5'-P-CCNC: 17 U/L (ref 9–39)
BASOPHILS # BLD AUTO: 0.1 X10*3/UL (ref 0–0.1)
BASOPHILS NFR BLD AUTO: 1 %
BILIRUB SERPL-MCNC: 0.5 MG/DL (ref 0–1.2)
BUN SERPL-MCNC: 14 MG/DL (ref 6–23)
CALCIUM SERPL-MCNC: 9.8 MG/DL (ref 8.6–10.3)
CHLORIDE SERPL-SCNC: 87 MMOL/L (ref 98–107)
CO2 SERPL-SCNC: 29 MMOL/L (ref 21–32)
CREAT SERPL-MCNC: 0.6 MG/DL (ref 0.5–1.05)
EOSINOPHIL # BLD AUTO: 0.28 X10*3/UL (ref 0–0.7)
EOSINOPHIL NFR BLD AUTO: 2.9 %
ERYTHROCYTE [DISTWIDTH] IN BLOOD BY AUTOMATED COUNT: 13 % (ref 11.5–14.5)
GFR SERPL CREATININE-BSD FRML MDRD: >90 ML/MIN/1.73M*2
GLUCOSE SERPL-MCNC: 83 MG/DL (ref 74–99)
HCT VFR BLD AUTO: 41.1 % (ref 36–46)
HGB BLD-MCNC: 14.2 G/DL (ref 12–16)
IMM GRANULOCYTES # BLD AUTO: 0.03 X10*3/UL (ref 0–0.7)
IMM GRANULOCYTES NFR BLD AUTO: 0.3 % (ref 0–0.9)
LYMPHOCYTES # BLD AUTO: 2.99 X10*3/UL (ref 1.2–4.8)
LYMPHOCYTES NFR BLD AUTO: 30.9 %
MCH RBC QN AUTO: 32.1 PG (ref 26–34)
MCHC RBC AUTO-ENTMCNC: 34.5 G/DL (ref 32–36)
MCV RBC AUTO: 93 FL (ref 80–100)
MONOCYTES # BLD AUTO: 0.77 X10*3/UL (ref 0.1–1)
MONOCYTES NFR BLD AUTO: 8 %
NEUTROPHILS # BLD AUTO: 5.51 X10*3/UL (ref 1.2–7.7)
NEUTROPHILS NFR BLD AUTO: 56.9 %
NRBC BLD-RTO: 0 /100 WBCS (ref 0–0)
PLATELET # BLD AUTO: 642 X10*3/UL (ref 150–450)
POTASSIUM SERPL-SCNC: 4.5 MMOL/L (ref 3.5–5.3)
PROT SERPL-MCNC: 7.3 G/DL (ref 6.4–8.2)
RBC # BLD AUTO: 4.43 X10*6/UL (ref 4–5.2)
SODIUM SERPL-SCNC: 124 MMOL/L (ref 136–145)
WBC # BLD AUTO: 9.7 X10*3/UL (ref 4.4–11.3)

## 2023-12-05 PROCEDURE — 83930 ASSAY OF BLOOD OSMOLALITY: CPT

## 2023-12-05 PROCEDURE — 83935 ASSAY OF URINE OSMOLALITY: CPT

## 2023-12-05 PROCEDURE — 84300 ASSAY OF URINE SODIUM: CPT

## 2023-12-05 PROCEDURE — 82570 ASSAY OF URINE CREATININE: CPT

## 2023-12-05 PROCEDURE — 36415 COLL VENOUS BLD VENIPUNCTURE: CPT

## 2023-12-05 PROCEDURE — 85025 COMPLETE CBC W/AUTO DIFF WBC: CPT

## 2023-12-05 PROCEDURE — 80053 COMPREHEN METABOLIC PANEL: CPT

## 2023-12-06 LAB
CREAT UR-MCNC: 32.2 MG/DL (ref 20–320)
OSMOLALITY SERPL: 262 MOSM/KG (ref 280–300)
OSMOLALITY UR: 234 MOSM/KG (ref 200–1200)
SODIUM UR-SCNC: 16 MMOL/L
SODIUM/CREAT UR-RTO: 50 MMOL/G CREAT

## 2023-12-12 DIAGNOSIS — E87.1 HYPONATREMIA: ICD-10-CM

## 2023-12-12 RX ORDER — SODIUM CHLORIDE 1000 MG
1 TABLET, SOLUBLE MISCELLANEOUS DAILY
Qty: 30 TABLET | Refills: 2 | Status: SHIPPED | OUTPATIENT
Start: 2023-12-12 | End: 2024-02-27 | Stop reason: SDUPTHER

## 2024-01-05 DIAGNOSIS — F32.A DEPRESSION, UNSPECIFIED: ICD-10-CM

## 2024-01-05 DIAGNOSIS — R56.9 SEIZURE (MULTI): ICD-10-CM

## 2024-01-08 RX ORDER — FLUOXETINE HYDROCHLORIDE 20 MG/1
20 CAPSULE ORAL DAILY
Qty: 90 CAPSULE | Refills: 3 | Status: SHIPPED | OUTPATIENT
Start: 2024-01-08

## 2024-01-08 RX ORDER — GABAPENTIN 100 MG/1
100 CAPSULE ORAL 3 TIMES DAILY
Qty: 90 CAPSULE | Refills: 1 | Status: SHIPPED | OUTPATIENT
Start: 2024-01-08 | End: 2024-03-22

## 2024-01-08 RX ORDER — CARBAMAZEPINE 100 MG/1
TABLET, EXTENDED RELEASE ORAL
Qty: 60 TABLET | Refills: 1 | Status: SHIPPED | OUTPATIENT
Start: 2024-01-08 | End: 2024-03-22
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
## 2024-02-26 ENCOUNTER — OFFICE VISIT (OUTPATIENT)
Dept: PRIMARY CARE | Facility: CLINIC | Age: 69
End: 2024-02-26
Payer: MEDICARE

## 2024-02-26 VITALS
WEIGHT: 138.6 LBS | HEIGHT: 63 IN | HEART RATE: 77 BPM | DIASTOLIC BLOOD PRESSURE: 85 MMHG | SYSTOLIC BLOOD PRESSURE: 173 MMHG | BODY MASS INDEX: 24.56 KG/M2

## 2024-02-26 DIAGNOSIS — R56.9 SEIZURE (MULTI): ICD-10-CM

## 2024-02-26 DIAGNOSIS — E51.9 THIAMINE DEFICIENCY: ICD-10-CM

## 2024-02-26 DIAGNOSIS — E87.1 HYPONATREMIA: ICD-10-CM

## 2024-02-26 DIAGNOSIS — R41.3 MEMORY DIFFICULTIES: Primary | ICD-10-CM

## 2024-02-26 PROCEDURE — 3077F SYST BP >= 140 MM HG: CPT | Performed by: REGISTERED NURSE

## 2024-02-26 PROCEDURE — 1125F AMNT PAIN NOTED PAIN PRSNT: CPT | Performed by: REGISTERED NURSE

## 2024-02-26 PROCEDURE — 3008F BODY MASS INDEX DOCD: CPT | Performed by: REGISTERED NURSE

## 2024-02-26 PROCEDURE — 1159F MED LIST DOCD IN RCRD: CPT | Performed by: REGISTERED NURSE

## 2024-02-26 PROCEDURE — 1160F RVW MEDS BY RX/DR IN RCRD: CPT | Performed by: REGISTERED NURSE

## 2024-02-26 PROCEDURE — 3079F DIAST BP 80-89 MM HG: CPT | Performed by: REGISTERED NURSE

## 2024-02-26 PROCEDURE — 99214 OFFICE O/P EST MOD 30 MIN: CPT | Performed by: REGISTERED NURSE

## 2024-02-26 ASSESSMENT — ENCOUNTER SYMPTOMS
CONFUSION: 0
NERVOUS/ANXIOUS: 0
FEVER: 0
CHILLS: 0
ABDOMINAL PAIN: 0
VOMITING: 0
NAUSEA: 0
HEADACHES: 0
WHEEZING: 0
COUGH: 0
CONSTIPATION: 0
EYE DISCHARGE: 0
DIZZINESS: 0
EYE REDNESS: 0
SHORTNESS OF BREATH: 0
DIFFICULTY URINATING: 0
WOUND: 0
RHINORRHEA: 0
FREQUENCY: 0
WEAKNESS: 0
DIARRHEA: 0

## 2024-02-26 NOTE — PATIENT INSTRUCTIONS
Neurology:  Ugo Alvarez () [phone]  Jose Jimenez () [phone]  Danielle Garcia () [phone]  Dustin Mix () [phone]  Sanya Nayak () [phone]  Hakeem Draper ([facility]) [phone]  [facility] Neuro [phone]    Lab is now on the Second floor of the [facility]

## 2024-02-27 ENCOUNTER — LAB (OUTPATIENT)
Dept: LAB | Facility: LAB | Age: 69
End: 2024-02-27
Payer: MEDICARE

## 2024-02-27 ENCOUNTER — OFFICE VISIT (OUTPATIENT)
Dept: SLEEP MEDICINE | Facility: CLINIC | Age: 69
End: 2024-02-27
Payer: MEDICARE

## 2024-02-27 VITALS
HEART RATE: 75 BPM | WEIGHT: 136.7 LBS | BODY MASS INDEX: 24.22 KG/M2 | OXYGEN SATURATION: 99 % | DIASTOLIC BLOOD PRESSURE: 76 MMHG | SYSTOLIC BLOOD PRESSURE: 147 MMHG

## 2024-02-27 DIAGNOSIS — G47.33 OSA (OBSTRUCTIVE SLEEP APNEA): Primary | ICD-10-CM

## 2024-02-27 DIAGNOSIS — G47.9 SLEEP DISTURBANCE: ICD-10-CM

## 2024-02-27 DIAGNOSIS — R56.9 CONVULSIONS, UNSPECIFIED CONVULSION TYPE (MULTI): ICD-10-CM

## 2024-02-27 DIAGNOSIS — E87.1 HYPONATREMIA: ICD-10-CM

## 2024-02-27 DIAGNOSIS — F17.200 SMOKER: ICD-10-CM

## 2024-02-27 DIAGNOSIS — R41.3 MEMORY DIFFICULTIES: ICD-10-CM

## 2024-02-27 DIAGNOSIS — Z13.31 POSITIVE SCREENING FOR DEPRESSION ON 2-ITEM PATIENT HEALTH QUESTIONNAIRE (PHQ-2): ICD-10-CM

## 2024-02-27 DIAGNOSIS — I10 PRIMARY HYPERTENSION: ICD-10-CM

## 2024-02-27 LAB
ALBUMIN SERPL BCP-MCNC: 4.5 G/DL (ref 3.4–5)
ALP SERPL-CCNC: 77 U/L (ref 33–136)
ALT SERPL W P-5'-P-CCNC: 13 U/L (ref 7–45)
ANION GAP SERPL CALC-SCNC: 12 MMOL/L (ref 10–20)
AST SERPL W P-5'-P-CCNC: 16 U/L (ref 9–39)
BASOPHILS # BLD AUTO: 0.09 X10*3/UL (ref 0–0.1)
BASOPHILS NFR BLD AUTO: 1 %
BILIRUB SERPL-MCNC: 0.4 MG/DL (ref 0–1.2)
BUN SERPL-MCNC: 13 MG/DL (ref 6–23)
CALCIUM SERPL-MCNC: 9.2 MG/DL (ref 8.6–10.3)
CHLORIDE SERPL-SCNC: 89 MMOL/L (ref 98–107)
CO2 SERPL-SCNC: 31 MMOL/L (ref 21–32)
CREAT SERPL-MCNC: 0.58 MG/DL (ref 0.5–1.05)
EGFRCR SERPLBLD CKD-EPI 2021: >90 ML/MIN/1.73M*2
EOSINOPHIL # BLD AUTO: 0.35 X10*3/UL (ref 0–0.7)
EOSINOPHIL NFR BLD AUTO: 4 %
ERYTHROCYTE [DISTWIDTH] IN BLOOD BY AUTOMATED COUNT: 12.1 % (ref 11.5–14.5)
GLUCOSE SERPL-MCNC: 94 MG/DL (ref 74–99)
HCT VFR BLD AUTO: 40.9 % (ref 36–46)
HGB BLD-MCNC: 14.1 G/DL (ref 12–16)
IMM GRANULOCYTES # BLD AUTO: 0.02 X10*3/UL (ref 0–0.7)
IMM GRANULOCYTES NFR BLD AUTO: 0.2 % (ref 0–0.9)
LYMPHOCYTES # BLD AUTO: 2.73 X10*3/UL (ref 1.2–4.8)
LYMPHOCYTES NFR BLD AUTO: 30.9 %
MCH RBC QN AUTO: 32.3 PG (ref 26–34)
MCHC RBC AUTO-ENTMCNC: 34.5 G/DL (ref 32–36)
MCV RBC AUTO: 94 FL (ref 80–100)
MONOCYTES # BLD AUTO: 0.75 X10*3/UL (ref 0.1–1)
MONOCYTES NFR BLD AUTO: 8.5 %
NEUTROPHILS # BLD AUTO: 4.89 X10*3/UL (ref 1.2–7.7)
NEUTROPHILS NFR BLD AUTO: 55.4 %
NRBC BLD-RTO: 0 /100 WBCS (ref 0–0)
PLATELET # BLD AUTO: 638 X10*3/UL (ref 150–450)
POTASSIUM SERPL-SCNC: 4.9 MMOL/L (ref 3.5–5.3)
PROT SERPL-MCNC: 6.8 G/DL (ref 6.4–8.2)
RBC # BLD AUTO: 4.37 X10*6/UL (ref 4–5.2)
SODIUM SERPL-SCNC: 127 MMOL/L (ref 136–145)
WBC # BLD AUTO: 8.8 X10*3/UL (ref 4.4–11.3)

## 2024-02-27 PROCEDURE — 1125F AMNT PAIN NOTED PAIN PRSNT: CPT

## 2024-02-27 PROCEDURE — 99214 OFFICE O/P EST MOD 30 MIN: CPT

## 2024-02-27 PROCEDURE — 3077F SYST BP >= 140 MM HG: CPT

## 2024-02-27 PROCEDURE — 1160F RVW MEDS BY RX/DR IN RCRD: CPT

## 2024-02-27 PROCEDURE — 1159F MED LIST DOCD IN RCRD: CPT

## 2024-02-27 PROCEDURE — 85025 COMPLETE CBC W/AUTO DIFF WBC: CPT

## 2024-02-27 PROCEDURE — 80053 COMPREHEN METABOLIC PANEL: CPT

## 2024-02-27 PROCEDURE — 36415 COLL VENOUS BLD VENIPUNCTURE: CPT

## 2024-02-27 PROCEDURE — 3008F BODY MASS INDEX DOCD: CPT

## 2024-02-27 PROCEDURE — 3078F DIAST BP <80 MM HG: CPT

## 2024-02-27 RX ORDER — SODIUM CHLORIDE 1000 MG
1 TABLET, SOLUBLE MISCELLANEOUS 2 TIMES DAILY
Qty: 28 TABLET | Refills: 0 | Status: SHIPPED | OUTPATIENT
Start: 2024-02-27 | End: 2024-03-12

## 2024-02-27 NOTE — PATIENT INSTRUCTIONS
It was a pleasure meeting you today Nima Chan     CURRENT DME: [facility]    As we discussed today in clinic:    1. Continue with your current CPAP setting.  Your doing a great job!  2. Encouraged to lose weight.   3. Remember, don't drive when sleepy.   4. Stay off your back when sleeping.       As a general guideline, please replace your: PAP cushions every 2-4 weeks, mask every 3-6 months, hose every 3-6 months, Filter (disposable) every 2-4 weeks; machine may need replacement in 5-10 years (once they stop working).     Please follow-up in 3 months    ALWAYS BRING YOUR CPAP / BIPAP WITH YOU TO EVERY APPOINTMENT!  THANKS    FOR QUESTIONS AND CONCERNS:   1. In case of problems with machine or mask interface, please contact your DME company first. DME is the company that provides you the machine and/or CPAP supplies. If [employer] if your DME, you can reach them at [phone] or [employer] [phone].  2. For SLEEP STUDY appointments, please call [phone] ([place]) or [phone] / [phone] ([facility]) or any other  Sleep Lab location please call [phone]  3. For MEDICAL QUESTIONS, MEDICATION REFILLS, or CLINIC APPOINTMENT SCHEDULING, please call [phone] and my practice lead [place] would gladly assist you with any concerns.   4. In the event that you are running more than 10 minutes late to your appointment or 5 minutes to virtual, I will kindly ask you to reschedule.    Here at [facility], we wish you a restful sleep!

## 2024-02-27 NOTE — PROGRESS NOTES
"Patient called stating that she thinks she saw neurology in the past, she gave us the date of August 23rd. Looked in her chart and her visit on 8/23/23 was with a Dr. Dowling who is psychiatry. She was evaluated and was told that her cognition is normal and her issues are likely related to her seizures, hyponatremia and etoh abuse. Discussed with patient and she admits to drinking \"6 beers some nights but not every night.\"    Encouraged patient to cut back on drinking, ideally to quit drinking. And to follow up with neurology. She had questions about autoimmune encephalitis, discussed that she would need to see neurology to get evaluated for this issue, patient states understanding and agreeable to schedule with neurology.    "

## 2024-02-28 RX ORDER — LANOLIN ALCOHOL/MO/W.PET/CERES
100 CREAM (GRAM) TOPICAL DAILY
Qty: 90 TABLET | Refills: 3 | Status: SHIPPED | OUTPATIENT
Start: 2024-02-28 | End: 2025-02-27

## 2024-03-21 DIAGNOSIS — R56.9 SEIZURE (MULTI): ICD-10-CM

## 2024-03-21 DIAGNOSIS — E87.1 HYPONATREMIA: ICD-10-CM

## 2024-03-22 DIAGNOSIS — R56.9 SEIZURE (MULTI): ICD-10-CM

## 2024-03-22 RX ORDER — GABAPENTIN 100 MG/1
100 CAPSULE ORAL 3 TIMES DAILY
Qty: 270 CAPSULE | Refills: 3 | Status: SHIPPED | OUTPATIENT
Start: 2024-03-22 | End: 2025-03-22

## 2024-03-22 RX ORDER — CARBAMAZEPINE 100 MG/1
TABLET, EXTENDED RELEASE ORAL
Qty: 60 TABLET | Refills: 1 | Status: SHIPPED | OUTPATIENT
Start: 2024-03-22 | End: 2024-05-15

## 2024-03-22 RX ORDER — GABAPENTIN 100 MG/1
100 CAPSULE ORAL 3 TIMES DAILY
Qty: 90 CAPSULE | Refills: 1 | Status: SHIPPED | OUTPATIENT
Start: 2024-03-22 | End: 2024-03-22 | Stop reason: SDUPTHER

## 2024-03-22 RX ORDER — SODIUM CHLORIDE 1 G/1
1 TABLET ORAL DAILY
Qty: 30 TABLET | Refills: 2 | Status: SHIPPED | OUTPATIENT
Start: 2024-03-22

## 2024-05-15 DIAGNOSIS — R56.9 SEIZURE (MULTI): ICD-10-CM

## 2024-05-15 RX ORDER — CARBAMAZEPINE 100 MG/1
TABLET, EXTENDED RELEASE ORAL
Qty: 60 TABLET | Refills: 1 | Status: SHIPPED | OUTPATIENT
Start: 2024-05-15

## 2024-05-21 ENCOUNTER — OFFICE VISIT (OUTPATIENT)
Dept: SLEEP MEDICINE | Facility: CLINIC | Age: 69
End: 2024-05-21
Payer: MEDICARE

## 2024-05-21 VITALS
WEIGHT: 133 LBS | BODY MASS INDEX: 23.56 KG/M2 | SYSTOLIC BLOOD PRESSURE: 171 MMHG | DIASTOLIC BLOOD PRESSURE: 87 MMHG | HEART RATE: 77 BPM | OXYGEN SATURATION: 96 %

## 2024-05-21 DIAGNOSIS — I10 PRIMARY HYPERTENSION: ICD-10-CM

## 2024-05-21 DIAGNOSIS — G47.33 OSA (OBSTRUCTIVE SLEEP APNEA): Primary | ICD-10-CM

## 2024-05-21 DIAGNOSIS — F17.200 SMOKER: ICD-10-CM

## 2024-05-21 DIAGNOSIS — Z78.9 INTOLERANCE OF CONTINUOUS POSITIVE AIRWAY PRESSURE (CPAP) VENTILATION: ICD-10-CM

## 2024-05-21 PROCEDURE — G2211 COMPLEX E/M VISIT ADD ON: HCPCS

## 2024-05-21 PROCEDURE — 3077F SYST BP >= 140 MM HG: CPT

## 2024-05-21 PROCEDURE — 99214 OFFICE O/P EST MOD 30 MIN: CPT

## 2024-05-21 PROCEDURE — 1159F MED LIST DOCD IN RCRD: CPT

## 2024-05-21 PROCEDURE — 1160F RVW MEDS BY RX/DR IN RCRD: CPT

## 2024-05-21 PROCEDURE — 3079F DIAST BP 80-89 MM HG: CPT

## 2024-05-21 PROCEDURE — 3008F BODY MASS INDEX DOCD: CPT

## 2024-05-21 ASSESSMENT — ANXIETY QUESTIONNAIRES
1. FEELING NERVOUS, ANXIOUS, OR ON EDGE: SEVERAL DAYS
3. WORRYING TOO MUCH ABOUT DIFFERENT THINGS: SEVERAL DAYS
6. BECOMING EASILY ANNOYED OR IRRITABLE: SEVERAL DAYS
2. NOT BEING ABLE TO STOP OR CONTROL WORRYING: SEVERAL DAYS
7. FEELING AFRAID AS IF SOMETHING AWFUL MIGHT HAPPEN: NOT AT ALL
5. BEING SO RESTLESS THAT IT IS HARD TO SIT STILL: NOT AT ALL
4. TROUBLE RELAXING: NOT AT ALL
GAD7 TOTAL SCORE: 4

## 2024-05-21 ASSESSMENT — SLEEP AND FATIGUE QUESTIONNAIRES
ESS-CHAD TOTAL SCORE: 4
DIFFICULTY_FALLING_ASLEEP: MILD
HOW LIKELY ARE YOU TO NOD OFF OR FALL ASLEEP WHEN YOU ARE A PASSENGER IN A CAR FOR AN HOUR WITHOUT A BREAK: WOULD NEVER DOZE
HOW LIKELY ARE YOU TO NOD OFF OR FALL ASLEEP WHILE SITTING AND TALKING TO SOMEONE: WOULD NEVER DOZE
SLEEP_PROBLEM_INTERFERES_DAILY_ACTIVITIES: SOMEWHAT
SATISFACTION_WITH_CURRENT_SLEEP_PATTERN: VERY SATISFIED
HOW LIKELY ARE YOU TO NOD OFF OR FALL ASLEEP IN A CAR, WHILE STOPPED FOR A FEW MINUTES IN TRAFFIC: WOULD NEVER DOZE
WORRIED_DISTRESSED_DUE_TO_SLEEP: NOT AT ALL NOTICEABLE
SLEEP_PROBLEM_NOTICEABLE_TO_OTHERS: SOMEWHAT
SITING INACTIVE IN A PUBLIC PLACE LIKE A CLASS ROOM OR A MOVIE THEATER: WOULD NEVER DOZE
HOW LIKELY ARE YOU TO NOD OFF OR FALL ASLEEP WHILE SITTING AND READING: WOULD NEVER DOZE
HOW LIKELY ARE YOU TO NOD OFF OR FALL ASLEEP WHILE WATCHING TV: MODERATE CHANCE OF DOZING
HOW LIKELY ARE YOU TO NOD OFF OR FALL ASLEEP WHILE LYING DOWN TO REST IN THE AFTERNOON WHEN CIRCUMSTANCES PERMIT: MODERATE CHANCE OF DOZING
HOW LIKELY ARE YOU TO NOD OFF OR FALL ASLEEP WHILE SITTING QUIETLY AFTER LUNCH WITHOUT ALCOHOL: WOULD NEVER DOZE

## 2024-05-21 ASSESSMENT — ENCOUNTER SYMPTOMS
EXCESSIVE DAYTIME SLEEPINESS: 0
SNORING: 0
INSOMNIA: 0
DIFFICULTY WITH CONCENTRATION: 1
HYPERSOMNIA: 0
FATIGUES EASILY: 1
DEPRESSED MOOD: 1

## 2024-05-21 NOTE — PATIENT INSTRUCTIONS
It was a pleasure meeting you today Nima Chan     As we discussed today in clinic:    1. We discussed multiple options today: Inspire, repeat sleep study with PAP therapy, or no treatment at all  2. Please let me know what your decision  3. Your BP was elevated today in clinic - monitor your BP, if continually elevated or your experience any lightheaded, dizziness, CP, headache or concerning symptoms notify your PCP or go to the nearest ER for evaluation     Please follow-up in  after your decision    ALWAYS BRING YOUR CPAP / BIPAP WITH YOU TO EVERY APPOINTMENT!  THANKS    FOR QUESTIONS AND CONCERNS:   1. In case of problems with machine or mask interface, please contact your DME company first. DME is the company that provides you the machine and/or CPAP supplies. If [employer] if your DME, you can reach them at [phone] or [employer] ([employer]) [phone].  2. For SLEEP STUDY appointments, please call [phone] ([facility]) or [phone] / [phone] ([facility]) or any other  Sleep Lab location please call [phone]  3. For MEDICAL QUESTIONS, MEDICATION REFILLS, or CLINIC APPOINTMENT SCHEDULING, please call [phone] and my practice lead [place] would gladly assist you with any concerns.   4. In the event that you are running more than 10 minutes late to your appointment or 5 minutes to virtual, I will kindly ask you to reschedule.    Here at [facility], we wish you a restful sleep!

## 2024-05-21 NOTE — PROGRESS NOTES
Patient: Nima Chan  : 1955 AGE: 68 y.o. SEX:female   MRN: [id number]   Provider: RENARD Ramirez     Location [facility] SLEEP MEDICINE   Service Date: 2024     PCP: RENARD Preciado   Referred by:               [facility]  FOLLOW-UP VISIT NOTE        HISTORY OF PRESENT ILLNESS     Patient ID: Nima Chan is a 68 y.o. female who presents to a [place] [facility] for follow-up DAVION, intolerant to PAP.    Patient is here alone today.  The patient has pertinent hx of DAVION, vivid dreams, normal BMI, HTN, Mitral valve prolapse, stroke, current smoker, marijuana use, alcohol use, depression, convulsions, & memory difficulties.     Previous Visit's:  24   Patient reports that she is trying to use her CPAP nightly. Some nights it is still a struggle. Patient reports that she does not feel that she gets any benefit from the CPAP. We discussed that she likely will not have any perceived benefit from the CPAP as we are treating co-morbid conditions such as HTN, mitral valve prolapse, memory concerns and seizures. She verbalized understanding. I encouraged patient to wear during the day as well as when she first gets into bed.   There is a significant mask leak noted, she reports likely d/t her taking off mask. Elevated AHI appears to correlate with mask leak.   She will be following up with neurology for her seizures. Again, reiterated the importance of using her CPAP at night to control co-morbid conditions.   BMI is normal  BP slightly elevated, no signs or symptoms of elevated BP.    23   Since last visit, patient has been using machine every night but not getting to 4 hours usage due to lack of perceived benefits.  Complains of just not motivated every night with machine.  The following are patient's perceived benefits of PAP: Patient denies any perceived benefits from using PAP. Patient states there  was no difference or improvement of symptoms with or without CPAP..  She initially struggled with the CPAP when she first started, she only had gotten the sleep study at the urge of brother. She has slowly gotten better with adherence to the PAP machine and mask. She does not notice a significant difference but there is minimal improvement with sleep. We discussed that we treat to reduce risk factors as well as for symptoms management. With more usage and consistency she will likely see an improvement with symptoms. Dreaming is not as vivid, doing a little better.    10/2/2023  On today's visit, the patient reports recent sleep study, here to review and further POC. She reports significant sleep symptoms. She also reports issues with vivid dreams. We reviewed her sleep study in depth today in clinic, including 3% vs 4% scoring. We discussed multiple treatment options today in clinic. We mutually agreed to start with CPAP therapy, potential for Inspire in the future. We reviewed the Resmed Airsense 11 machine and multiple mask styles today in clinic. We reviewed once she gets the machine, insurance compliance requirements as well as 30 day mask guarantees.       Interval History  Patient was last seen in 2/2024.     05/21/24   Patient here today for follow-up DAVION on PAP therapy. She reports that she just could not tolerate the CPAP any more, she stopped using. She recently received a letter from [facility] to return machine or get repeat testing to continue with machine. I discussed with patient multiple options for treatment. Given her current intolerance and lack of motivation for PAP (does not feel there was anything wrong with her sleep, son was concerned he heard her snoring at night), we discussed multiple options. We discussed repeat PSG with titration and mask fitting, Inspire or no treatment at all. We discussed all treatment options. I will send patient home with information for Inspire. I will have her call with  her decision.   BP is elevated today, no symptoms of elevated BP, on medications  + MH screens    SLEEP HISTORY     SLEEP STUDY HISTORY  PSG - 6/25/2023  BMI - 23.3  RDI4% - 16.6/hr  MARCELLO - 0.8/hr  SpO2 mariann - 87%    SLEEP-WAKE SCHEDULE  Bedtime: 11 pm  Wake time: 8 am    SLEEP HABITS   Smoking: current  ETOH:  YES  Marijuana:  YES  Caffeine:  YES  Sleep aids: PRN Advil PM     WEIGHT: stable    REVIEW OF SYSTEMS     REVIEW OF SYSTEMS  SLEEP ROS   Review of Systems   Respiratory:  Negative for snoring.    Neurological:  Positive for difficulty with concentration. Negative for excessive daytime sleepiness.   Psychiatric/Behavioral:  The patient does not have insomnia.         Psych Review of Symptoms:    Anxiety:   Generalized Anxiety Symptoms: Difficulty controlling worry, difficulty with concentration, fatigues easily and sleep disturbances due to anxiety.       Depressive Symptoms:   Depressed mood, decreased interest and irritable. No hypersomnia, no insomnia and no suicidal ideation.      Sleep Concerns:   No excessive daytime sleepiness, no difficulty staying asleep, no restless sleep, no awakening from sleep, no difficulty falling asleep and no snoring.           All other systems have been reviewed and are negative.      ALLERGIES     No Known Allergies    MEDICATIONS     Current Outpatient Medications   Medication Sig Dispense Refill    amLODIPine (Norvasc) 10 mg tablet Take 1 tablet (10 mg) by mouth once daily. 30 tablet 11    ascorbic acid (Vitamin C) 1,000 mg tablet Take 1 tablet (1,000 mg) by mouth once daily.      aspirin 81 mg EC tablet Take 1 tablet (81 mg) by mouth once daily.      carBAMazepine XR (TEGretol XR) 100 mg 12 hr tablet TAKE 1 TABLET BY MOUTH TWICE A DAY . DO NOT CRUSH, CHEW, OR SPLIT 60 tablet 1    cholecalciferol (Vitamin D-3) 50 mcg (2,000 unit) capsule Take 1 capsule (50 mcg) by mouth once daily.      cyanocobalamin, vitamin B-12, (Vitamin B-12) 1,000 mcg tablet extended release Take 1  tablet (1,000 mcg) by mouth once daily.      FLUoxetine (PROzac) 20 mg capsule TAKE 1 CAPSULE BY MOUTH EVERY DAY 90 capsule 3    FLUoxetine (PROzac) 20 mg tablet Take 1 tablet (20 mg) by mouth once daily.      gabapentin (Neurontin) 100 mg capsule Take 1 capsule (100 mg) by mouth 3 times a day. 270 capsule 3    Lactobac 42/Bifid 8/colost/FOS (PROBIOTIC PLUS COLOSTRUM ORAL) Take by mouth.      mv,calcium,min/iron/folic/vitK (MULTI FOR HER ORAL) Take 1 tablet by mouth 1 (one) time each day.      propranolol (Inderal) 20 mg tablet START W/ 1 TAB BY MOUTH TWICE DAILY FOR 3 WKS, THEN CAN INCREASE TO 1 TAB 3 TIMES DAILY FOR SYMPTOMS 270 tablet 1    sodium chloride 1,000 mg tablet TAKE 1 TABLET (1 GRAM) BY MOUTH ONCE DAILY 30 tablet 2    thiamine (Vitamin B-1) 100 mg tablet Take 1 tablet (100 mg) by mouth once daily. 90 tablet 3    zinc gluconate-zinc picolinate 30 mg capsule Take by mouth.       No current facility-administered medications for this visit.       PAST HISTORY     PERTINENT PAST MEDICAL HISTORY: See HPI    PERTINENT PAST SURGICAL HISTORY for Sleep Medicine:  non-contributory    PERTINENT FAMILY HISTORY for Sleep Medicine:  Patient denies family history of any sleep disorder.  Patient denies family history of sleep apnea.    PERTINENT SOCIAL HISTORY:  She  reports that she has been smoking cigarettes. She has been exposed to tobacco smoke. She has never used smokeless tobacco. She reports current alcohol use. She reports that she does not currently use drugs after having used the following drugs: Marijuana. She currently lives alone and retired from work.     Active Problems, Allergy List, Medication List, and PMH/PSH/FH/Social Hx have been reviewed and reconciled in chart. No significant changes unless documented in the pertinent chart section. Updates made when necessary.     PHYSICAL EXAM     VITAL SIGNS: /87   Pulse 77   Wt 60.3 kg (133 lb)   SpO2 96%   BMI 23.56 kg/m²     CURRENT WEIGHT:  "  Vitals:    05/21/24 1314   Weight: 60.3 kg (133 lb)      BMI: Body mass index is 23.56 kg/m².     PREVIOUS WEIGHTS:  Wt Readings from Last 3 Encounters:   05/21/24 60.3 kg (133 lb)   02/27/24 62 kg (136 lb 11.2 oz)   02/26/24 62.9 kg (138 lb 9.6 oz)       Today ESS: 4  Today RAYA: 5  Today FRANSISCO-7: 4   Today PHQ-2: POSITIVE  little interest or pleasure = 1  down, depressed or hopeless = 1    PHYSICAL EXAMINATION  General appearance: awake alert in NAD  Affect: normal  Skin: no rash noted to face  HEENT: Nasal congestion absent  Teeth: normal dentition  Lungs: no cough.  Extr: moves all four extremities  Neuro: normal speech        RESULTS/DATA     No results found for: \"IRON\", \"TRANSFERRIN\", \"IRONSAT\", \"TIBC\", \"FERRITIN\"    Bicarbonate   Date Value Ref Range Status   02/27/2024 31 21 - 32 mmol/L Final       PAP Adherence  A PAP adherence download was obtained and data was reviewed personally today in clinic.        ASSESSMENT/PLAN     Assessment/Plan   Nima Chan is a 68 y.o. female presents today in [facility] with the following problems:    CURRENT DME: [facility]    OBSTRUCTIVE SLEEP APNEA, moderate - severe (PSG AHI: 16.6/hr)  currently on auto-CPAP 5 - 15 cmH20 EPR 3  Poor compliance to PAP therapy  - Patient's risk factors for DAVION: age, postmenopause, HTN, use of ETOH, current smoker, and narrow crowded upper airway anatomy  - DAVION diagnosed by PSG in 6/2023. Reviewed sleep studies previously in clinic. See HPI  - Retrieved and personally reviewed recent PAP adherence download data today. See HPI.   - - used 29/90 days with 10/90 days >4 hrs, for an average of 3 hrs 41 mins with residual AHI of 7.4/hr  - we discussed multiple treatment options: repeat PSG with titration and mask fitting, Inspire consult or no treatment  - will have patient review options and call with decision      NORMAL BMI  - BMI today Body mass index is 23.56 kg/m².   - no significant weight changes noted or " reported  - Encouraged patient to lose weight with diet and exercise.   - Weight loss can help in the long term treatment of DAVION.  - defer management to PCP       HYPERTENSION  - BP today 171/87  - denies any headache, blurry vision, chest pain, palpitation, dizziness, lightheadedness, or syncopal episodes  - encouraged daily exercise with healthy diet for BP and DAVION management  - on meds per PCP  - Defer management to PCP    DEPRESSION / ANXIETY screen  DEPRESSION  / ANXIETY   + screens  - denies any SI, HI or hallucinations   - currently on medication  - defer management to PCP      SMOKING STATUS screen  SMOKING STATUS - ACTIVE  - current daily smoker  - not ready to quit  - encouraged smoking cessation at least 3 hrs prior to bedtime      All of patient's questions were answered. She verbalizes understanding and agreement with my assessment and plan.

## 2024-05-28 DIAGNOSIS — I10 PRIMARY HYPERTENSION: ICD-10-CM

## 2024-05-28 RX ORDER — AMLODIPINE BESYLATE 10 MG/1
10 TABLET ORAL DAILY
Qty: 90 TABLET | Refills: 3 | Status: SHIPPED | OUTPATIENT
Start: 2024-05-28

## 2024-06-07 DIAGNOSIS — R25.1 TREMOR OF BOTH HANDS: ICD-10-CM

## 2024-06-07 RX ORDER — PROPRANOLOL HYDROCHLORIDE 20 MG/1
TABLET ORAL
Qty: 270 TABLET | Refills: 1 | Status: SHIPPED | OUTPATIENT
Start: 2024-06-07

## 2024-06-17 ENCOUNTER — APPOINTMENT (OUTPATIENT)
Dept: PRIMARY CARE | Facility: CLINIC | Age: 69
End: 2024-06-17
Payer: MEDICARE

## 2024-06-17 VITALS
BODY MASS INDEX: 23.53 KG/M2 | HEART RATE: 67 BPM | HEIGHT: 63 IN | SYSTOLIC BLOOD PRESSURE: 168 MMHG | DIASTOLIC BLOOD PRESSURE: 84 MMHG | WEIGHT: 132.8 LBS

## 2024-06-17 DIAGNOSIS — Z12.31 BREAST CANCER SCREENING BY MAMMOGRAM: ICD-10-CM

## 2024-06-17 DIAGNOSIS — F10.90 ALCOHOL USE DISORDER: ICD-10-CM

## 2024-06-17 DIAGNOSIS — R41.89 COGNITIVE CHANGES: ICD-10-CM

## 2024-06-17 DIAGNOSIS — Z00.00 ROUTINE GENERAL MEDICAL EXAMINATION AT HEALTH CARE FACILITY: Primary | ICD-10-CM

## 2024-06-17 DIAGNOSIS — I10 PRIMARY HYPERTENSION: ICD-10-CM

## 2024-06-17 DIAGNOSIS — R41.3 MEMORY DIFFICULTIES: ICD-10-CM

## 2024-06-17 DIAGNOSIS — E87.1 HYPONATREMIA: ICD-10-CM

## 2024-06-17 PROBLEM — E66.3 OVERWEIGHT: Status: RESOLVED | Noted: 2023-10-10 | Resolved: 2024-06-17

## 2024-06-17 PROBLEM — R11.0 NAUSEA IN ADULT: Status: RESOLVED | Noted: 2023-01-28 | Resolved: 2024-06-17

## 2024-06-17 PROCEDURE — 3077F SYST BP >= 140 MM HG: CPT | Performed by: REGISTERED NURSE

## 2024-06-17 PROCEDURE — 1159F MED LIST DOCD IN RCRD: CPT | Performed by: REGISTERED NURSE

## 2024-06-17 PROCEDURE — 1124F ACP DISCUSS-NO DSCNMKR DOCD: CPT | Performed by: REGISTERED NURSE

## 2024-06-17 PROCEDURE — 3079F DIAST BP 80-89 MM HG: CPT | Performed by: REGISTERED NURSE

## 2024-06-17 PROCEDURE — G0439 PPPS, SUBSEQ VISIT: HCPCS | Performed by: REGISTERED NURSE

## 2024-06-17 PROCEDURE — 1170F FXNL STATUS ASSESSED: CPT | Performed by: REGISTERED NURSE

## 2024-06-17 PROCEDURE — 1160F RVW MEDS BY RX/DR IN RCRD: CPT | Performed by: REGISTERED NURSE

## 2024-06-17 PROCEDURE — 3008F BODY MASS INDEX DOCD: CPT | Performed by: REGISTERED NURSE

## 2024-06-17 ASSESSMENT — ENCOUNTER SYMPTOMS
HEADACHES: 0
RHINORRHEA: 0
DIARRHEA: 0
DIFFICULTY URINATING: 0
CONSTIPATION: 0
ABDOMINAL PAIN: 0
VOMITING: 0
SHORTNESS OF BREATH: 0
EYE DISCHARGE: 0
WEAKNESS: 0
EYE REDNESS: 0
CHILLS: 0
NAUSEA: 0
NERVOUS/ANXIOUS: 0
WOUND: 0
DIZZINESS: 0
FREQUENCY: 0
WHEEZING: 0
FEVER: 0
CONFUSION: 0
COUGH: 0

## 2024-06-17 ASSESSMENT — ACTIVITIES OF DAILY LIVING (ADL)
GROCERY_SHOPPING: INDEPENDENT
DRESSING: INDEPENDENT
BATHING: INDEPENDENT
TAKING_MEDICATION: INDEPENDENT
DOING_HOUSEWORK: INDEPENDENT
MANAGING_FINANCES: INDEPENDENT

## 2024-06-17 ASSESSMENT — PATIENT HEALTH QUESTIONNAIRE - PHQ9
2. FEELING DOWN, DEPRESSED OR HOPELESS: SEVERAL DAYS
10. IF YOU CHECKED OFF ANY PROBLEMS, HOW DIFFICULT HAVE THESE PROBLEMS MADE IT FOR YOU TO DO YOUR WORK, TAKE CARE OF THINGS AT HOME, OR GET ALONG WITH OTHER PEOPLE: NOT DIFFICULT AT ALL
1. LITTLE INTEREST OR PLEASURE IN DOING THINGS: NOT AT ALL
SUM OF ALL RESPONSES TO PHQ9 QUESTIONS 1 AND 2: 1
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
## 2024-06-26 ENCOUNTER — APPOINTMENT (OUTPATIENT)
Dept: PRIMARY CARE | Facility: CLINIC | Age: 69
End: 2024-06-26
Payer: MEDICARE

## 2024-07-08 DIAGNOSIS — E87.1 HYPONATREMIA: ICD-10-CM

## 2024-07-08 RX ORDER — SODIUM CHLORIDE 1 G/1
1 TABLET ORAL DAILY
Qty: 30 TABLET | Refills: 2 | Status: SHIPPED | OUTPATIENT
Start: 2024-07-08

## 2024-07-09 DIAGNOSIS — R56.9 SEIZURE (MULTI): ICD-10-CM

## 2024-07-09 RX ORDER — CARBAMAZEPINE 100 MG/1
TABLET, EXTENDED RELEASE ORAL
Qty: 60 TABLET | Refills: 1 | Status: SHIPPED | OUTPATIENT
Start: 2024-07-09

## 2024-08-20 ENCOUNTER — LAB (OUTPATIENT)
Dept: LAB | Facility: LAB | Age: 69
End: 2024-08-20
Payer: MEDICARE

## 2024-08-20 ENCOUNTER — TELEPHONE (OUTPATIENT)
Dept: PRIMARY CARE | Facility: CLINIC | Age: 69
End: 2024-08-20

## 2024-08-20 DIAGNOSIS — E87.1 HYPONATREMIA: ICD-10-CM

## 2024-08-20 LAB
ALBUMIN SERPL BCP-MCNC: 4.4 G/DL (ref 3.4–5)
ALP SERPL-CCNC: 79 U/L (ref 33–136)
ALT SERPL W P-5'-P-CCNC: 11 U/L (ref 7–45)
ANION GAP SERPL CALC-SCNC: 16 MMOL/L (ref 10–20)
AST SERPL W P-5'-P-CCNC: 16 U/L (ref 9–39)
BASOPHILS # BLD AUTO: 0.08 X10*3/UL (ref 0–0.1)
BASOPHILS NFR BLD AUTO: 0.8 %
BILIRUB SERPL-MCNC: 0.5 MG/DL (ref 0–1.2)
BUN SERPL-MCNC: 10 MG/DL (ref 6–23)
CALCIUM SERPL-MCNC: 9.4 MG/DL (ref 8.6–10.3)
CHLORIDE SERPL-SCNC: 83 MMOL/L (ref 98–107)
CO2 SERPL-SCNC: 25 MMOL/L (ref 21–32)
CREAT SERPL-MCNC: 0.55 MG/DL (ref 0.5–1.05)
EGFRCR SERPLBLD CKD-EPI 2021: >90 ML/MIN/1.73M*2
EOSINOPHIL # BLD AUTO: 0.36 X10*3/UL (ref 0–0.7)
EOSINOPHIL NFR BLD AUTO: 3.6 %
ERYTHROCYTE [DISTWIDTH] IN BLOOD BY AUTOMATED COUNT: 11.8 % (ref 11.5–14.5)
GLUCOSE SERPL-MCNC: 94 MG/DL (ref 74–99)
HCT VFR BLD AUTO: 38.4 % (ref 36–46)
HGB BLD-MCNC: 13.5 G/DL (ref 12–16)
IMM GRANULOCYTES # BLD AUTO: 0.03 X10*3/UL (ref 0–0.7)
IMM GRANULOCYTES NFR BLD AUTO: 0.3 % (ref 0–0.9)
LYMPHOCYTES # BLD AUTO: 3.37 X10*3/UL (ref 1.2–4.8)
LYMPHOCYTES NFR BLD AUTO: 33.5 %
MCH RBC QN AUTO: 32.5 PG (ref 26–34)
MCHC RBC AUTO-ENTMCNC: 35.2 G/DL (ref 32–36)
MCV RBC AUTO: 92 FL (ref 80–100)
MONOCYTES # BLD AUTO: 0.84 X10*3/UL (ref 0.1–1)
MONOCYTES NFR BLD AUTO: 8.3 %
NEUTROPHILS # BLD AUTO: 5.38 X10*3/UL (ref 1.2–7.7)
NEUTROPHILS NFR BLD AUTO: 53.5 %
NRBC BLD-RTO: 0 /100 WBCS (ref 0–0)
PLATELET # BLD AUTO: 570 X10*3/UL (ref 150–450)
POTASSIUM SERPL-SCNC: 4.5 MMOL/L (ref 3.5–5.3)
PROT SERPL-MCNC: 6.7 G/DL (ref 6.4–8.2)
RBC # BLD AUTO: 4.16 X10*6/UL (ref 4–5.2)
SODIUM SERPL-SCNC: 119 MMOL/L (ref 136–145)
WBC # BLD AUTO: 10.1 X10*3/UL (ref 4.4–11.3)

## 2024-08-20 PROCEDURE — 85025 COMPLETE CBC W/AUTO DIFF WBC: CPT

## 2024-08-20 PROCEDURE — 80053 COMPREHEN METABOLIC PANEL: CPT

## 2024-08-20 PROCEDURE — 36415 COLL VENOUS BLD VENIPUNCTURE: CPT

## 2024-08-20 NOTE — TELEPHONE ENCOUNTER
Nima called in with concerns of her seizures. She stated that she is having one at least once a month now. She has an appointment with neurology next month on the 24th and is taking her medication daily.   Please advise.

## 2024-08-20 NOTE — TELEPHONE ENCOUNTER
Okay she has some blood work in, I would like for her to go get this done, usually she has seizures when her sodium drops. I have talked to her about drinking being a triggering factor, can you ask her if she is still drinking and how much, she needs to cut back to lessen the possibility of seizure

## 2024-08-21 ENCOUNTER — APPOINTMENT (OUTPATIENT)
Dept: RADIOLOGY | Facility: HOSPITAL | Age: 69
End: 2024-08-21
Payer: MEDICARE

## 2024-08-21 ENCOUNTER — APPOINTMENT (OUTPATIENT)
Dept: CARDIOLOGY | Facility: HOSPITAL | Age: 69
End: 2024-08-21
Payer: MEDICARE

## 2024-08-21 ENCOUNTER — HOSPITAL ENCOUNTER (INPATIENT)
Facility: HOSPITAL | Age: 69
LOS: 2 days | Discharge: HOME | End: 2024-08-24
Attending: FAMILY MEDICINE | Admitting: INTERNAL MEDICINE
Payer: MEDICARE

## 2024-08-21 DIAGNOSIS — G40.909 SEIZURE DISORDER (MULTI): ICD-10-CM

## 2024-08-21 DIAGNOSIS — E87.1 HYPONATREMIA: Primary | ICD-10-CM

## 2024-08-21 DIAGNOSIS — S42.001A CLOSED DISPLACED FRACTURE OF RIGHT CLAVICLE, UNSPECIFIED PART OF CLAVICLE, INITIAL ENCOUNTER: ICD-10-CM

## 2024-08-21 DIAGNOSIS — F10.90 ALCOHOL USE DISORDER: ICD-10-CM

## 2024-08-21 DIAGNOSIS — T14.8XXA COMMINUTED FRACTURE: ICD-10-CM

## 2024-08-21 DIAGNOSIS — M25.511 ACUTE PAIN OF RIGHT SHOULDER: ICD-10-CM

## 2024-08-21 DIAGNOSIS — R79.89 ELEVATED TROPONIN: ICD-10-CM

## 2024-08-21 DIAGNOSIS — F10.11 HISTORY OF ALCOHOL ABUSE: ICD-10-CM

## 2024-08-21 DIAGNOSIS — R41.82 ALTERED MENTAL STATUS, UNSPECIFIED ALTERED MENTAL STATUS TYPE: ICD-10-CM

## 2024-08-21 LAB
ALBUMIN SERPL BCP-MCNC: 4.1 G/DL (ref 3.4–5)
ALP SERPL-CCNC: 74 U/L (ref 33–136)
ALT SERPL W P-5'-P-CCNC: 12 U/L (ref 7–45)
AMMONIA PLAS-SCNC: 16 UMOL/L (ref 16–53)
AMPHETAMINES UR QL SCN: ABNORMAL
ANION GAP SERPL CALC-SCNC: 10 MMOL/L (ref 10–20)
ANION GAP SERPL CALC-SCNC: 13 MMOL/L (ref 10–20)
APPEARANCE UR: CLEAR
APTT PPP: 38 SECONDS (ref 27–38)
AST SERPL W P-5'-P-CCNC: 16 U/L (ref 9–39)
BARBITURATES UR QL SCN: ABNORMAL
BASOPHILS # BLD AUTO: 0.05 X10*3/UL (ref 0–0.1)
BASOPHILS NFR BLD AUTO: 0.3 %
BENZODIAZ UR QL SCN: ABNORMAL
BILIRUB SERPL-MCNC: 0.8 MG/DL (ref 0–1.2)
BILIRUB UR STRIP.AUTO-MCNC: NEGATIVE MG/DL
BNP SERPL-MCNC: 145 PG/ML (ref 0–99)
BUN SERPL-MCNC: 4 MG/DL (ref 6–23)
BUN SERPL-MCNC: 6 MG/DL (ref 6–23)
BZE UR QL SCN: ABNORMAL
CALCIUM SERPL-MCNC: 8.3 MG/DL (ref 8.6–10.3)
CALCIUM SERPL-MCNC: 8.6 MG/DL (ref 8.6–10.3)
CANNABINOIDS UR QL SCN: ABNORMAL
CARDIAC TROPONIN I PNL SERPL HS: 19 NG/L (ref 0–13)
CARDIAC TROPONIN I PNL SERPL HS: 22 NG/L (ref 0–13)
CHLORIDE SERPL-SCNC: 85 MMOL/L (ref 98–107)
CHLORIDE SERPL-SCNC: 94 MMOL/L (ref 98–107)
CO2 SERPL-SCNC: 24 MMOL/L (ref 21–32)
CO2 SERPL-SCNC: 26 MMOL/L (ref 21–32)
COLOR UR: YELLOW
CREAT SERPL-MCNC: 0.39 MG/DL (ref 0.5–1.05)
CREAT SERPL-MCNC: 0.49 MG/DL (ref 0.5–1.05)
EGFRCR SERPLBLD CKD-EPI 2021: >90 ML/MIN/1.73M*2
EGFRCR SERPLBLD CKD-EPI 2021: >90 ML/MIN/1.73M*2
EOSINOPHIL # BLD AUTO: 0.02 X10*3/UL (ref 0–0.7)
EOSINOPHIL NFR BLD AUTO: 0.1 %
ERYTHROCYTE [DISTWIDTH] IN BLOOD BY AUTOMATED COUNT: 11.5 % (ref 11.5–14.5)
ETHANOL SERPL-MCNC: <10 MG/DL
FENTANYL+NORFENTANYL UR QL SCN: ABNORMAL
GLUCOSE BLD MANUAL STRIP-MCNC: 121 MG/DL (ref 74–99)
GLUCOSE SERPL-MCNC: 129 MG/DL (ref 74–99)
GLUCOSE SERPL-MCNC: 174 MG/DL (ref 74–99)
GLUCOSE UR STRIP.AUTO-MCNC: NEGATIVE MG/DL
HCT VFR BLD AUTO: 38.4 % (ref 36–46)
HGB BLD-MCNC: 13.8 G/DL (ref 12–16)
HOLD SPECIMEN: NORMAL
IMM GRANULOCYTES # BLD AUTO: 0.05 X10*3/UL (ref 0–0.7)
IMM GRANULOCYTES NFR BLD AUTO: 0.3 % (ref 0–0.9)
INR PPP: 0.9 (ref 0.9–1.1)
KETONES UR STRIP.AUTO-MCNC: ABNORMAL MG/DL
LACTATE SERPL-SCNC: 0.9 MMOL/L (ref 0.4–2)
LEUKOCYTE ESTERASE UR QL STRIP.AUTO: NEGATIVE
LIPASE SERPL-CCNC: 20 U/L (ref 9–82)
LYMPHOCYTES # BLD AUTO: 2.17 X10*3/UL (ref 1.2–4.8)
LYMPHOCYTES NFR BLD AUTO: 14.5 %
MCH RBC QN AUTO: 32.5 PG (ref 26–34)
MCHC RBC AUTO-ENTMCNC: 35.9 G/DL (ref 32–36)
MCV RBC AUTO: 90 FL (ref 80–100)
METHADONE UR QL SCN: ABNORMAL
MONOCYTES # BLD AUTO: 1.26 X10*3/UL (ref 0.1–1)
MONOCYTES NFR BLD AUTO: 8.4 %
NEUTROPHILS # BLD AUTO: 11.42 X10*3/UL (ref 1.2–7.7)
NEUTROPHILS NFR BLD AUTO: 76.4 %
NITRITE UR QL STRIP.AUTO: NEGATIVE
NRBC BLD-RTO: 0 /100 WBCS (ref 0–0)
OPIATES UR QL SCN: ABNORMAL
OXYCODONE+OXYMORPHONE UR QL SCN: ABNORMAL
PCP UR QL SCN: ABNORMAL
PH UR STRIP.AUTO: 7 [PH]
PLATELET # BLD AUTO: 499 X10*3/UL (ref 150–450)
POTASSIUM SERPL-SCNC: 2.9 MMOL/L (ref 3.5–5.3)
POTASSIUM SERPL-SCNC: 3.7 MMOL/L (ref 3.5–5.3)
PROT SERPL-MCNC: 6.6 G/DL (ref 6.4–8.2)
PROT UR STRIP.AUTO-MCNC: ABNORMAL MG/DL
PROTHROMBIN TIME: 10.5 SECONDS (ref 9.8–12.8)
RBC # BLD AUTO: 4.25 X10*6/UL (ref 4–5.2)
RBC # UR STRIP.AUTO: ABNORMAL /UL
RBC #/AREA URNS AUTO: NORMAL /HPF
SODIUM SERPL-SCNC: 120 MMOL/L (ref 136–145)
SODIUM SERPL-SCNC: 125 MMOL/L (ref 136–145)
SP GR UR STRIP.AUTO: 1
SQUAMOUS #/AREA URNS AUTO: NORMAL /HPF
UROBILINOGEN UR STRIP.AUTO-MCNC: <2 MG/DL
WBC # BLD AUTO: 15 X10*3/UL (ref 4.4–11.3)
WBC #/AREA URNS AUTO: NORMAL /HPF

## 2024-08-21 PROCEDURE — 80307 DRUG TEST PRSMV CHEM ANLYZR: CPT | Performed by: FAMILY MEDICINE

## 2024-08-21 PROCEDURE — 82140 ASSAY OF AMMONIA: CPT | Performed by: FAMILY MEDICINE

## 2024-08-21 PROCEDURE — 2500000004 HC RX 250 GENERAL PHARMACY W/ HCPCS (ALT 636 FOR OP/ED): Performed by: FAMILY MEDICINE

## 2024-08-21 PROCEDURE — 84484 ASSAY OF TROPONIN QUANT: CPT | Performed by: FAMILY MEDICINE

## 2024-08-21 PROCEDURE — 83690 ASSAY OF LIPASE: CPT | Performed by: FAMILY MEDICINE

## 2024-08-21 PROCEDURE — 96367 TX/PROPH/DG ADDL SEQ IV INF: CPT

## 2024-08-21 PROCEDURE — 82374 ASSAY BLOOD CARBON DIOXIDE: CPT | Performed by: NURSE PRACTITIONER

## 2024-08-21 PROCEDURE — 82947 ASSAY GLUCOSE BLOOD QUANT: CPT

## 2024-08-21 PROCEDURE — 70450 CT HEAD/BRAIN W/O DYE: CPT

## 2024-08-21 PROCEDURE — 36415 COLL VENOUS BLD VENIPUNCTURE: CPT | Performed by: FAMILY MEDICINE

## 2024-08-21 PROCEDURE — 96375 TX/PRO/DX INJ NEW DRUG ADDON: CPT

## 2024-08-21 PROCEDURE — 85610 PROTHROMBIN TIME: CPT | Performed by: FAMILY MEDICINE

## 2024-08-21 PROCEDURE — G0378 HOSPITAL OBSERVATION PER HR: HCPCS

## 2024-08-21 PROCEDURE — 96376 TX/PRO/DX INJ SAME DRUG ADON: CPT

## 2024-08-21 PROCEDURE — 85730 THROMBOPLASTIN TIME PARTIAL: CPT | Performed by: FAMILY MEDICINE

## 2024-08-21 PROCEDURE — 80053 COMPREHEN METABOLIC PANEL: CPT | Performed by: FAMILY MEDICINE

## 2024-08-21 PROCEDURE — 71250 CT THORAX DX C-: CPT | Mod: FOREIGN READ | Performed by: RADIOLOGY

## 2024-08-21 PROCEDURE — 94760 N-INVAS EAR/PLS OXIMETRY 1: CPT

## 2024-08-21 PROCEDURE — 81001 URINALYSIS AUTO W/SCOPE: CPT | Performed by: FAMILY MEDICINE

## 2024-08-21 PROCEDURE — 2500000004 HC RX 250 GENERAL PHARMACY W/ HCPCS (ALT 636 FOR OP/ED): Performed by: NURSE PRACTITIONER

## 2024-08-21 PROCEDURE — 71250 CT THORAX DX C-: CPT

## 2024-08-21 PROCEDURE — 83880 ASSAY OF NATRIURETIC PEPTIDE: CPT | Performed by: FAMILY MEDICINE

## 2024-08-21 PROCEDURE — 82077 ASSAY SPEC XCP UR&BREATH IA: CPT | Performed by: FAMILY MEDICINE

## 2024-08-21 PROCEDURE — 93005 ELECTROCARDIOGRAM TRACING: CPT

## 2024-08-21 PROCEDURE — 70450 CT HEAD/BRAIN W/O DYE: CPT | Performed by: RADIOLOGY

## 2024-08-21 PROCEDURE — 96372 THER/PROPH/DIAG INJ SC/IM: CPT | Performed by: NURSE PRACTITIONER

## 2024-08-21 PROCEDURE — 99285 EMERGENCY DEPT VISIT HI MDM: CPT | Mod: 25

## 2024-08-21 PROCEDURE — 96361 HYDRATE IV INFUSION ADD-ON: CPT

## 2024-08-21 PROCEDURE — 85025 COMPLETE CBC W/AUTO DIFF WBC: CPT | Performed by: FAMILY MEDICINE

## 2024-08-21 PROCEDURE — 83605 ASSAY OF LACTIC ACID: CPT | Performed by: FAMILY MEDICINE

## 2024-08-21 PROCEDURE — 96365 THER/PROPH/DIAG IV INF INIT: CPT

## 2024-08-21 RX ORDER — CARBAMAZEPINE 100 MG/1
100 TABLET, EXTENDED RELEASE ORAL
Status: DISCONTINUED | OUTPATIENT
Start: 2024-08-22 | End: 2024-08-22

## 2024-08-21 RX ORDER — PROPRANOLOL HYDROCHLORIDE 10 MG/1
20 TABLET ORAL 2 TIMES DAILY
Status: DISCONTINUED | OUTPATIENT
Start: 2024-08-21 | End: 2024-08-24 | Stop reason: HOSPADM

## 2024-08-21 RX ORDER — CEFTRIAXONE 1 G/50ML
1 INJECTION, SOLUTION INTRAVENOUS EVERY 24 HOURS
Status: DISCONTINUED | OUTPATIENT
Start: 2024-08-21 | End: 2024-08-23

## 2024-08-21 RX ORDER — ENOXAPARIN SODIUM 100 MG/ML
40 INJECTION SUBCUTANEOUS EVERY 24 HOURS
Status: DISCONTINUED | OUTPATIENT
Start: 2024-08-21 | End: 2024-08-24 | Stop reason: HOSPADM

## 2024-08-21 RX ORDER — PANTOPRAZOLE SODIUM 40 MG/1
40 TABLET, DELAYED RELEASE ORAL
Status: DISCONTINUED | OUTPATIENT
Start: 2024-08-22 | End: 2024-08-24 | Stop reason: HOSPADM

## 2024-08-21 RX ORDER — BISMUTH SUBSALICYLATE 262 MG
1 TABLET,CHEWABLE ORAL DAILY
Status: DISCONTINUED | OUTPATIENT
Start: 2024-08-21 | End: 2024-08-24 | Stop reason: HOSPADM

## 2024-08-21 RX ORDER — AZITHROMYCIN 250 MG/1
500 TABLET, FILM COATED ORAL
Status: COMPLETED | OUTPATIENT
Start: 2024-08-22 | End: 2024-08-24

## 2024-08-21 RX ORDER — SODIUM CHLORIDE 9 MG/ML
INJECTION, SOLUTION INTRAVENOUS
Status: DISPENSED
Start: 2024-08-21 | End: 2024-08-22

## 2024-08-21 RX ORDER — LORAZEPAM 2 MG/ML
1 INJECTION INTRAMUSCULAR EVERY 2 HOUR PRN
Status: DISCONTINUED | OUTPATIENT
Start: 2024-08-21 | End: 2024-08-23

## 2024-08-21 RX ORDER — LORAZEPAM 2 MG/ML
0.5 INJECTION INTRAMUSCULAR EVERY 2 HOUR PRN
Status: DISCONTINUED | OUTPATIENT
Start: 2024-08-21 | End: 2024-08-23

## 2024-08-21 RX ORDER — ONDANSETRON HYDROCHLORIDE 2 MG/ML
4 INJECTION, SOLUTION INTRAVENOUS EVERY 8 HOURS PRN
Status: DISCONTINUED | OUTPATIENT
Start: 2024-08-21 | End: 2024-08-24 | Stop reason: HOSPADM

## 2024-08-21 RX ORDER — ACETAMINOPHEN 325 MG/1
650 TABLET ORAL EVERY 6 HOURS PRN
Status: DISCONTINUED | OUTPATIENT
Start: 2024-08-21 | End: 2024-08-24 | Stop reason: HOSPADM

## 2024-08-21 RX ORDER — ASPIRIN 81 MG/1
81 TABLET ORAL DAILY
Status: DISCONTINUED | OUTPATIENT
Start: 2024-08-21 | End: 2024-08-24 | Stop reason: HOSPADM

## 2024-08-21 RX ORDER — POLYETHYLENE GLYCOL 3350 17 G/17G
17 POWDER, FOR SOLUTION ORAL DAILY
Status: DISCONTINUED | OUTPATIENT
Start: 2024-08-21 | End: 2024-08-24 | Stop reason: HOSPADM

## 2024-08-21 RX ORDER — ONDANSETRON 4 MG/1
4 TABLET, ORALLY DISINTEGRATING ORAL EVERY 8 HOURS PRN
Status: DISCONTINUED | OUTPATIENT
Start: 2024-08-21 | End: 2024-08-24 | Stop reason: HOSPADM

## 2024-08-21 RX ORDER — SODIUM CHLORIDE 1000 MG
1 TABLET, SOLUBLE MISCELLANEOUS DAILY
Status: DISCONTINUED | OUTPATIENT
Start: 2024-08-21 | End: 2024-08-24 | Stop reason: HOSPADM

## 2024-08-21 RX ORDER — AZITHROMYCIN 250 MG/1
500 TABLET, FILM COATED ORAL
Status: DISCONTINUED | OUTPATIENT
Start: 2024-08-21 | End: 2024-08-21

## 2024-08-21 RX ORDER — LANOLIN ALCOHOL/MO/W.PET/CERES
1000 CREAM (GRAM) TOPICAL DAILY
Status: DISCONTINUED | OUTPATIENT
Start: 2024-08-21 | End: 2024-08-24 | Stop reason: HOSPADM

## 2024-08-21 RX ORDER — FLUOXETINE 10 MG/1
20 CAPSULE ORAL DAILY
Status: DISCONTINUED | OUTPATIENT
Start: 2024-08-21 | End: 2024-08-24 | Stop reason: HOSPADM

## 2024-08-21 RX ORDER — AMLODIPINE BESYLATE 5 MG/1
10 TABLET ORAL DAILY
Status: DISCONTINUED | OUTPATIENT
Start: 2024-08-21 | End: 2024-08-24 | Stop reason: HOSPADM

## 2024-08-21 RX ORDER — LANOLIN ALCOHOL/MO/W.PET/CERES
100 CREAM (GRAM) TOPICAL DAILY
Status: DISCONTINUED | OUTPATIENT
Start: 2024-08-21 | End: 2024-08-21

## 2024-08-21 RX ORDER — TALC
3 POWDER (GRAM) TOPICAL NIGHTLY PRN
Status: DISCONTINUED | OUTPATIENT
Start: 2024-08-21 | End: 2024-08-24 | Stop reason: HOSPADM

## 2024-08-21 RX ORDER — ASCORBIC ACID 500 MG
1000 TABLET ORAL DAILY
Status: DISCONTINUED | OUTPATIENT
Start: 2024-08-21 | End: 2024-08-24 | Stop reason: HOSPADM

## 2024-08-21 RX ORDER — CHOLECALCIFEROL (VITAMIN D3) 25 MCG
2000 TABLET ORAL DAILY
Status: DISCONTINUED | OUTPATIENT
Start: 2024-08-22 | End: 2024-08-24 | Stop reason: HOSPADM

## 2024-08-21 RX ORDER — LANOLIN ALCOHOL/MO/W.PET/CERES
100 CREAM (GRAM) TOPICAL DAILY
Status: DISCONTINUED | OUTPATIENT
Start: 2024-08-21 | End: 2024-08-24 | Stop reason: HOSPADM

## 2024-08-21 RX ORDER — GABAPENTIN 100 MG/1
100 CAPSULE ORAL 3 TIMES DAILY
Status: DISCONTINUED | OUTPATIENT
Start: 2024-08-21 | End: 2024-08-24 | Stop reason: HOSPADM

## 2024-08-21 RX ORDER — POTASSIUM CHLORIDE 14.9 MG/ML
20 INJECTION INTRAVENOUS
Status: COMPLETED | OUTPATIENT
Start: 2024-08-21 | End: 2024-08-22

## 2024-08-21 RX ORDER — FOLIC ACID 1 MG/1
1 TABLET ORAL DAILY
Status: DISCONTINUED | OUTPATIENT
Start: 2024-08-21 | End: 2024-08-24 | Stop reason: HOSPADM

## 2024-08-21 RX ORDER — DIAZEPAM 5 MG/ML
2 INJECTION, SOLUTION INTRAMUSCULAR; INTRAVENOUS EVERY 8 HOURS PRN
Status: DISCONTINUED | OUTPATIENT
Start: 2024-08-21 | End: 2024-08-23

## 2024-08-21 RX ORDER — SODIUM CHLORIDE 9 MG/ML
75 INJECTION, SOLUTION INTRAVENOUS CONTINUOUS
Status: DISCONTINUED | OUTPATIENT
Start: 2024-08-21 | End: 2024-08-23

## 2024-08-21 RX ADMIN — LORAZEPAM 1 MG: 2 INJECTION INTRAMUSCULAR; INTRAVENOUS at 21:19

## 2024-08-21 RX ADMIN — SODIUM CHLORIDE 125 ML/HR: 9 INJECTION, SOLUTION INTRAVENOUS at 13:00

## 2024-08-21 RX ADMIN — POTASSIUM CHLORIDE 20 MEQ: 14.9 INJECTION, SOLUTION INTRAVENOUS at 21:31

## 2024-08-21 RX ADMIN — ENOXAPARIN SODIUM 40 MG: 40 INJECTION SUBCUTANEOUS at 21:22

## 2024-08-21 RX ADMIN — CEFTRIAXONE 1 G: 1 INJECTION, SOLUTION INTRAVENOUS at 21:07

## 2024-08-21 RX ADMIN — AZITHROMYCIN 500 MG: 500 INJECTION, POWDER, LYOPHILIZED, FOR SOLUTION INTRAVENOUS at 18:00

## 2024-08-21 RX ADMIN — LORAZEPAM 1 MG: 2 INJECTION INTRAMUSCULAR; INTRAVENOUS at 18:09

## 2024-08-21 SDOH — SOCIAL STABILITY: SOCIAL INSECURITY: WERE YOU ABLE TO COMPLETE ALL THE BEHAVIORAL HEALTH SCREENINGS?: NO

## 2024-08-21 ASSESSMENT — PAIN SCALES - PAIN ASSESSMENT IN ADVANCED DEMENTIA (PAINAD)
BREATHING: NORMAL
TOTALSCORE: 0
FACIALEXPRESSION: SMILING OR INEXPRESSIVE
BODYLANGUAGE: TENSE, DISTRESSED PACING, FIDGETING
FACIALEXPRESSION: SMILING OR INEXPRESSIVE
BREATHING: NORMAL
TOTALSCORE: 1
CONSOLABILITY: NO NEED TO CONSOLE
BODYLANGUAGE: RELAXED
CONSOLABILITY: NO NEED TO CONSOLE

## 2024-08-21 ASSESSMENT — ACTIVITIES OF DAILY LIVING (ADL)
GROOMING: UNABLE TO ASSESS
DRESSING YOURSELF: UNABLE TO ASSESS
HEARING - LEFT EAR: UNABLE TO ASSESS
JUDGMENT_ADEQUATE_SAFELY_COMPLETE_DAILY_ACTIVITIES: UNABLE TO ASSESS
ASSISTIVE_DEVICE: OTHER (COMMENT)
ADEQUATE_TO_COMPLETE_ADL: UNABLE TO ASSESS
WALKS IN HOME: UNABLE TO ASSESS
LACK_OF_TRANSPORTATION: PATIENT UNABLE TO ANSWER
PATIENT'S MEMORY ADEQUATE TO SAFELY COMPLETE DAILY ACTIVITIES?: UNABLE TO ASSESS
BATHING: UNABLE TO ASSESS
TOILETING: UNABLE TO ASSESS
FEEDING YOURSELF: UNABLE TO ASSESS
HEARING - RIGHT EAR: UNABLE TO ASSESS

## 2024-08-21 ASSESSMENT — LIFESTYLE VARIABLES
TOTAL SCORE: 10
AUDITORY DISTURBANCES: NOT PRESENT
ORIENTATION AND CLOUDING OF SENSORIUM: DISORIENTED FOR PLACE OR PERSON
ORIENTATION AND CLOUDING OF SENSORIUM: ORIENTED AND CAN DO SERIAL ADDITIONS
ANXIETY: MODERATELY ANXIOUS, OR GUARDED, SO ANXIETY IS INFERRED
HEADACHE, FULLNESS IN HEAD: NOT PRESENT
TREMOR: NO TREMOR
PAROXYSMAL SWEATS: BARELY PERCEPTIBLE SWEATING, PALMS MOIST
ORIENTATION AND CLOUDING OF SENSORIUM: DISORIENTED FOR PLACE OR PERSON
TREMOR: NOT VISIBLE, BUT CAN BE FELT FINGERTIP TO FINGERTIP
AGITATION: MODERATELY FIDGETY AND RESTLESS
VISUAL DISTURBANCES: NOT PRESENT
VISUAL DISTURBANCES: NOT PRESENT
AUDITORY DISTURBANCES: NOT PRESENT
AGITATION: 3
AUDITORY DISTURBANCES: NOT PRESENT
PAROXYSMAL SWEATS: 2
NAUSEA AND VOMITING: NO NAUSEA AND NO VOMITING
TOTAL SCORE: 11
ANXIETY: NO ANXIETY, AT EASE
NAUSEA AND VOMITING: NO NAUSEA AND NO VOMITING
VISUAL DISTURBANCES: NOT PRESENT
PAROXYSMAL SWEATS: BARELY PERCEPTIBLE SWEATING, PALMS MOIST
TREMOR: NO TREMOR
AGITATION: SOMEWHAT MORE THAN NORMAL ACTIVITY
HEADACHE, FULLNESS IN HEAD: NOT PRESENT
HEADACHE, FULLNESS IN HEAD: NOT PRESENT
TOTAL SCORE: 6
NAUSEA AND VOMITING: NO NAUSEA AND NO VOMITING
ANXIETY: 2

## 2024-08-21 ASSESSMENT — COGNITIVE AND FUNCTIONAL STATUS - GENERAL
TURNING FROM BACK TO SIDE WHILE IN FLAT BAD: A LOT
DRESSING REGULAR LOWER BODY CLOTHING: TOTAL
PATIENT BASELINE BEDBOUND: UNABLE TO ASSESS AT THIS TIME
MOVING TO AND FROM BED TO CHAIR: TOTAL
MOVING FROM LYING ON BACK TO SITTING ON SIDE OF FLAT BED WITH BEDRAILS: A LOT
EATING MEALS: TOTAL
MOBILITY SCORE: 8
WALKING IN HOSPITAL ROOM: TOTAL
DRESSING REGULAR UPPER BODY CLOTHING: TOTAL
CLIMB 3 TO 5 STEPS WITH RAILING: TOTAL
TOILETING: TOTAL
PERSONAL GROOMING: TOTAL
STANDING UP FROM CHAIR USING ARMS: TOTAL
HELP NEEDED FOR BATHING: TOTAL

## 2024-08-21 ASSESSMENT — COLUMBIA-SUICIDE SEVERITY RATING SCALE - C-SSRS
1. IN THE PAST MONTH, HAVE YOU WISHED YOU WERE DEAD OR WISHED YOU COULD GO TO SLEEP AND NOT WAKE UP?: NO
2. HAVE YOU ACTUALLY HAD ANY THOUGHTS OF KILLING YOURSELF?: NO
6. HAVE YOU EVER DONE ANYTHING, STARTED TO DO ANYTHING, OR PREPARED TO DO ANYTHING TO END YOUR LIFE?: NO

## 2024-08-21 ASSESSMENT — PAIN - FUNCTIONAL ASSESSMENT
PAIN_FUNCTIONAL_ASSESSMENT: PAINAD (PAIN ASSESSMENT IN ADVANCED DEMENTIA SCALE)
PAIN_FUNCTIONAL_ASSESSMENT: UNABLE TO SELF-REPORT
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
## 2024-08-21 NOTE — ED PROVIDER NOTES
HPI   Chief Complaint   Patient presents with    Seizures     Unknown duration/ time of event.       HPI  This is a 69-year-old female patient with known history of seizure disorder brought into the emergency room with suspected seizure when she found postictal condition type behavior and incontinence of she was put on toilet seat when she had bowel movement however she remained in postictal trepidation no response to interaction with the family and they were concerned they were also concerned about abnormal labs and primary care physician Dr. Paul advised patient to be sent to the ER for abnormal labs findings.  She was found to have low sodium on the labs be checked in the ER from on the labs done as an outpatient  Family denies any reported trauma fall injury or any weakness trauma fall injury.  She however has right shoulder clavicle area bruising tenderness family later on arrived and stated that patient does have history of alcohol use.    Family history: Unobtainable  Social history: She lives at home alone family checks on her  Review of system: 10 review of system obtained review of systems In \[facility]\"" otherwise negative.      Patient History   Past Medical History:   Diagnosis Date    Personal history of other diseases of the circulatory system     History of hypertension    Personal history of transient ischemic attack (TIA), and cerebral infarction without residual deficits     History of stroke     Past Surgical History:   Procedure Laterality Date    MR HEAD ANGIO WO IV CONTRAST  2/13/2023    MR HEAD ANGIO WO IV CONTRAST 2/13/2023 [facility]    MR NECK ANGIO WO IV CONTRAST  2/13/2023    MR NECK ANGIO WO IV CONTRAST 2/13/2023 [facility]    OTHER SURGICAL HISTORY  01/30/2020    Tonsillectomy    OTHER SURGICAL HISTORY  01/30/2020    Tubal ligation    OTHER SURGICAL HISTORY  01/30/2020    Complete colonoscopy    TONSILLECTOMY  09/12/2018    Tonsillectomy    TUBAL LIGATION  09/12/2018     Tubal Ligation     Family History   Problem Relation Name Age of Onset    Other (cardiac disorder) Mother      Other (cerebrovascular accident) Mother      Hypertension Mother      Other (cardiac disorder) Father      Colon cancer Sister      Hypertension Brother      Other (cardiac disorder) Maternal Grandmother      Lung cancer Maternal Grandfather      Cancer Paternal Grandmother      Other (cardiac disorder) Paternal Grandfather       Social History     Tobacco Use    Smoking status: Every Day     Types: Cigarettes     Passive exposure: Current    Smokeless tobacco: Never   Substance Use Topics    Alcohol use: Yes     Comment: Beer    Drug use: Not Currently     Types: Marijuana   EKG obtained on the day of visit at 1244 hrs. showed sinus tachycardia rate of 101.  Bilateral atrial enlargement.  No ST-T evaluation.  No STEMI.  Abnormal EKG, I read this EKG.      Physical Exam   ED Triage Vitals [08/21/24 1242]   Temperature Heart Rate Respirations BP   36.1 °C (97 °F) 97 16 155/86      SpO2 Temp Source Heart Rate Source Patient Position   -- Tympanic -- --      BP Location FiO2 (%)     -- --       Physical Exam  Constitutional:       Appearance: Normal appearance.      Comments: Patient who was found to be incontinent of stool at arrival was remained postictal required personal care and then close observation and to make sure she protecting her airway.  Her examination was limited due to being postictal and nonresponsive.  Her vital signs however are stable.  Pupils are equal respond to light bilaterally.  Extraocular motor function cannot be fully evaluated.  No ear nose discharge head with normocephalic atraumatic.  Right-sided anterior chest shoulder area contusion bruise noted.  Cervical thoracic lumbar spine without any bruise or evidence of injury tenderness cannot be fully evaluated she was logrolled to evaluate her neck and back.  Chest wall nontender.  Breathing without acute respiratory distress no  tachypnea hypoxemia respiratory distress.  Heart regular rate and rhythm lungs auscultated abdomen soft nontender no guarding rebound rigidity.  Per tenderness evaluation was limited.     HENT:      Head: Normocephalic and atraumatic.      Right Ear: Tympanic membrane, ear canal and external ear normal.      Left Ear: Tympanic membrane, ear canal and external ear normal.      Nose: Nose normal.      Mouth/Throat:      Mouth: Mucous membranes are moist.   Eyes:      Extraocular Movements: Extraocular movements intact.      Conjunctiva/sclera: Conjunctivae normal.      Pupils: Pupils are equal, round, and reactive to light.   Neck:      Vascular: No carotid bruit.   Cardiovascular:      Rate and Rhythm: Normal rate and regular rhythm.      Pulses: Normal pulses.      Heart sounds: Normal heart sounds. No murmur heard.     No friction rub. No gallop.   Pulmonary:      Effort: Pulmonary effort is normal. No respiratory distress.      Breath sounds: Normal breath sounds. No stridor. No rales.   Abdominal:      General: Abdomen is flat. Bowel sounds are normal.      Palpations: Abdomen is soft.   Musculoskeletal:         General: No swelling, tenderness, deformity or signs of injury. Normal range of motion.      Cervical back: Normal range of motion and neck supple. No rigidity or tenderness.      Right lower leg: No edema.      Left lower leg: No edema.   Lymphadenopathy:      Cervical: No cervical adenopathy.   Neurological:      General: No focal deficit present.      Mental Status: She is alert and oriented to person, place, and time.      Cranial Nerves: No cranial nerve deficit.      Sensory: No sensory deficit.      Motor: No weakness.      Coordination: Coordination normal.      Gait: Gait normal.      Deep Tendon Reflexes: Reflexes normal.   Psychiatric:         Mood and Affect: Mood normal.         Behavior: Behavior normal.           ED Course & MDM   Diagnoses as of 09/07/24 0144   Altered mental status,  unspecified altered mental status type   Seizure disorder (Multi)   Hyponatremia   Elevated troponin   History of alcohol abuse   Comminuted fracture   Closed displaced fracture of right clavicle, unspecified part of clavicle, initial encounter   This is a 69-year-old female patient with known history of seizure disorder brought into the emergency room with suspected seizure when she found postictal condition type behavior and incontinence of she was put on toilet seat when she had bowel movement however she remained in postictal trepidation no response to interaction with the family and they were concerned they were also concerned about abnormal labs and primary care physician Dr. Paul advised patient to be sent to the ER for abnormal labs findings.  She was found to have low sodium on the labs be checked in the ER from on the labs done as an outpatient  Family denies any reported trauma fall injury or any weakness trauma fall injury.  She however has right shoulder clavicle area bruising tenderness family later on arrived and stated that patient does have history of alcohol use.          Patient who was found to be incontinent of stool at arrival was remained postictal required personal care and then close observation and to make sure she protecting her airway.  Her examination was limited due to being postictal and nonresponsive.  Her vital signs however are stable.  Pupils are equal respond to light bilaterally.  Extraocular motor function cannot be fully evaluated.  No ear nose discharge head with normocephalic atraumatic.  Right-sided anterior chest shoulder area contusion bruise noted.  Cervical thoracic lumbar spine without any bruise or evidence of injury tenderness cannot be fully evaluated she was logrolled to evaluate her neck and back.  Chest wall nontender.  Breathing without acute respiratory distress no tachypnea hypoxemia respiratory distress.  Heart regular rate and rhythm lungs auscultated  abdomen soft nontender no guarding rebound rigidity.  Per tenderness evaluation was limited.      No data recorded                         Patient was protecting her airway as result we did obtain labs and kept IN patient patient white count of 15,000 H&H is 13 and 38.  Troponin is elevated 22 and 19 serum sodium was 129 low chloride 85 potassium 3.7.  BNP of 145.  Lactate was 0.9.  INR PT was normal.  Urinalysis showed some ketones with no difference or UTI.  She was noted a blood sugar 121 and chemistry showed blood sugar 129.    CT of the head was negative for acute intracranial abnormality however patient was noted to have pansinusitis  CT of the chest showed comminuted fracture of the right clavicle causing swelling and lifting of pectoralis major and edema see CT report for details.  There are no beds available at [facility] facility and patient need to be hospitalized as result patient admitted to [facility] for further care and treatment.  She was hemodynamically stable.      Medical Decision Making      Procedure  Procedures     Sun Carrillo MD  09/07/24 0145       Sun Carrillo MD  09/17/24 [id number]

## 2024-08-22 LAB
ALBUMIN SERPL BCP-MCNC: 3.9 G/DL (ref 3.4–5)
ANION GAP SERPL CALC-SCNC: 9 MMOL/L (ref 10–20)
BUN SERPL-MCNC: 4 MG/DL (ref 6–23)
CALCIUM SERPL-MCNC: 8.6 MG/DL (ref 8.6–10.3)
CHLORIDE SERPL-SCNC: 96 MMOL/L (ref 98–107)
CO2 SERPL-SCNC: 26 MMOL/L (ref 21–32)
CREAT SERPL-MCNC: 0.43 MG/DL (ref 0.5–1.05)
EGFRCR SERPLBLD CKD-EPI 2021: >90 ML/MIN/1.73M*2
ERYTHROCYTE [DISTWIDTH] IN BLOOD BY AUTOMATED COUNT: 11.6 % (ref 11.5–14.5)
GLUCOSE SERPL-MCNC: 101 MG/DL (ref 74–99)
HCT VFR BLD AUTO: 37.4 % (ref 36–46)
HGB BLD-MCNC: 13.3 G/DL (ref 12–16)
MAGNESIUM SERPL-MCNC: 1.68 MG/DL (ref 1.6–2.4)
MCH RBC QN AUTO: 32.4 PG (ref 26–34)
MCHC RBC AUTO-ENTMCNC: 35.6 G/DL (ref 32–36)
MCV RBC AUTO: 91 FL (ref 80–100)
NRBC BLD-RTO: 0 /100 WBCS (ref 0–0)
PHOSPHATE SERPL-MCNC: 2.8 MG/DL (ref 2.5–4.9)
PLATELET # BLD AUTO: 518 X10*3/UL (ref 150–450)
POTASSIUM SERPL-SCNC: 3.1 MMOL/L (ref 3.5–5.3)
RBC # BLD AUTO: 4.1 X10*6/UL (ref 4–5.2)
SODIUM SERPL-SCNC: 128 MMOL/L (ref 136–145)
WBC # BLD AUTO: 10.4 X10*3/UL (ref 4.4–11.3)

## 2024-08-22 PROCEDURE — 83735 ASSAY OF MAGNESIUM: CPT | Performed by: NURSE PRACTITIONER

## 2024-08-22 PROCEDURE — 94760 N-INVAS EAR/PLS OXIMETRY 1: CPT

## 2024-08-22 PROCEDURE — 96376 TX/PRO/DX INJ SAME DRUG ADON: CPT

## 2024-08-22 PROCEDURE — 80069 RENAL FUNCTION PANEL: CPT | Performed by: NURSE PRACTITIONER

## 2024-08-22 PROCEDURE — 85027 COMPLETE CBC AUTOMATED: CPT | Performed by: NURSE PRACTITIONER

## 2024-08-22 PROCEDURE — 1200000002 HC GENERAL ROOM WITH TELEMETRY DAILY: Mod: IPSPLIT

## 2024-08-22 PROCEDURE — 2500000002 HC RX 250 W HCPCS SELF ADMINISTERED DRUGS (ALT 637 FOR MEDICARE OP, ALT 636 FOR OP/ED): Performed by: NURSE PRACTITIONER

## 2024-08-22 PROCEDURE — 2500000004 HC RX 250 GENERAL PHARMACY W/ HCPCS (ALT 636 FOR OP/ED): Mod: IPSPLIT | Performed by: NURSE PRACTITIONER

## 2024-08-22 PROCEDURE — 36415 COLL VENOUS BLD VENIPUNCTURE: CPT | Performed by: NURSE PRACTITIONER

## 2024-08-22 PROCEDURE — 2500000001 HC RX 250 WO HCPCS SELF ADMINISTERED DRUGS (ALT 637 FOR MEDICARE OP): Performed by: NURSE PRACTITIONER

## 2024-08-22 PROCEDURE — 99222 1ST HOSP IP/OBS MODERATE 55: CPT | Performed by: NURSE PRACTITIONER

## 2024-08-22 PROCEDURE — 2500000002 HC RX 250 W HCPCS SELF ADMINISTERED DRUGS (ALT 637 FOR MEDICARE OP, ALT 636 FOR OP/ED): Mod: IPSPLIT | Performed by: NURSE PRACTITIONER

## 2024-08-22 PROCEDURE — 2500000004 HC RX 250 GENERAL PHARMACY W/ HCPCS (ALT 636 FOR OP/ED)

## 2024-08-22 PROCEDURE — 2500000001 HC RX 250 WO HCPCS SELF ADMINISTERED DRUGS (ALT 637 FOR MEDICARE OP): Mod: IPSPLIT | Performed by: NURSE PRACTITIONER

## 2024-08-22 PROCEDURE — S4991 NICOTINE PATCH NONLEGEND: HCPCS | Performed by: NURSE PRACTITIONER

## 2024-08-22 RX ORDER — KETOROLAC TROMETHAMINE 30 MG/ML
15 INJECTION, SOLUTION INTRAMUSCULAR; INTRAVENOUS EVERY 6 HOURS PRN
Status: DISCONTINUED | OUTPATIENT
Start: 2024-08-22 | End: 2024-08-24 | Stop reason: HOSPADM

## 2024-08-22 RX ORDER — SODIUM CHLORIDE 9 MG/ML
INJECTION, SOLUTION INTRAVENOUS
Status: COMPLETED
Start: 2024-08-22 | End: 2024-08-22

## 2024-08-22 RX ORDER — CARBAMAZEPINE 200 MG/1
100 TABLET ORAL 2 TIMES DAILY
Status: DISCONTINUED | OUTPATIENT
Start: 2024-08-22 | End: 2024-08-24 | Stop reason: HOSPADM

## 2024-08-22 RX ORDER — IBUPROFEN 200 MG
1 TABLET ORAL DAILY
Status: DISCONTINUED | OUTPATIENT
Start: 2024-08-22 | End: 2024-08-24 | Stop reason: HOSPADM

## 2024-08-22 RX ORDER — POTASSIUM CHLORIDE 20 MEQ/1
40 TABLET, EXTENDED RELEASE ORAL ONCE
Status: COMPLETED | OUTPATIENT
Start: 2024-08-22 | End: 2024-08-22

## 2024-08-22 RX ORDER — OXYCODONE HYDROCHLORIDE 5 MG/1
5 TABLET ORAL EVERY 6 HOURS PRN
Status: DISCONTINUED | OUTPATIENT
Start: 2024-08-22 | End: 2024-08-24 | Stop reason: HOSPADM

## 2024-08-22 RX ADMIN — ACETAMINOPHEN 650 MG: 325 TABLET ORAL at 09:15

## 2024-08-22 RX ADMIN — THIAMINE HCL TAB 100 MG 100 MG: 100 TAB at 09:13

## 2024-08-22 RX ADMIN — FLUOXETINE 20 MG: 10 CAPSULE ORAL at 09:14

## 2024-08-22 RX ADMIN — GABAPENTIN 100 MG: 100 CAPSULE ORAL at 09:14

## 2024-08-22 RX ADMIN — CARBAMAZEPINE 100 MG: 200 TABLET ORAL at 20:15

## 2024-08-22 RX ADMIN — AMLODIPINE BESYLATE 10 MG: 5 TABLET ORAL at 09:13

## 2024-08-22 RX ADMIN — SODIUM CHLORIDE 75 ML/HR: 9 INJECTION, SOLUTION INTRAVENOUS at 11:30

## 2024-08-22 RX ADMIN — ASPIRIN 81 MG: 81 TABLET, COATED ORAL at 09:14

## 2024-08-22 RX ADMIN — POTASSIUM CHLORIDE 20 MEQ: 14.9 INJECTION, SOLUTION INTRAVENOUS at 00:02

## 2024-08-22 RX ADMIN — OXYCODONE HYDROCHLORIDE AND ACETAMINOPHEN 1000 MG: 500 TABLET ORAL at 09:14

## 2024-08-22 RX ADMIN — PROPRANOLOL HYDROCHLORIDE 20 MG: 10 TABLET ORAL at 20:14

## 2024-08-22 RX ADMIN — POLYETHYLENE GLYCOL 3350 17 G: 17 POWDER, FOR SOLUTION ORAL at 09:14

## 2024-08-22 RX ADMIN — PROPRANOLOL HYDROCHLORIDE 20 MG: 10 TABLET ORAL at 09:14

## 2024-08-22 RX ADMIN — NICOTINE 1 PATCH: 21 PATCH, EXTENDED RELEASE TRANSDERMAL at 01:00

## 2024-08-22 RX ADMIN — GABAPENTIN 100 MG: 100 CAPSULE ORAL at 15:59

## 2024-08-22 RX ADMIN — SODIUM CHLORIDE 75 ML/HR: 0.9 INJECTION, SOLUTION INTRAVENOUS at 11:30

## 2024-08-22 RX ADMIN — AZITHROMYCIN DIHYDRATE 500 MG: 250 TABLET ORAL at 18:25

## 2024-08-22 RX ADMIN — ENOXAPARIN SODIUM 40 MG: 40 INJECTION SUBCUTANEOUS at 18:25

## 2024-08-22 RX ADMIN — POTASSIUM CHLORIDE 40 MEQ: 1500 TABLET, EXTENDED RELEASE ORAL at 11:52

## 2024-08-22 RX ADMIN — LORAZEPAM 1 MG: 2 INJECTION INTRAMUSCULAR; INTRAVENOUS at 03:08

## 2024-08-22 RX ADMIN — OXYCODONE HYDROCHLORIDE 5 MG: 5 TABLET ORAL at 20:19

## 2024-08-22 RX ADMIN — THERA TABS 1 TABLET: TAB at 09:14

## 2024-08-22 RX ADMIN — CARBAMAZEPINE 100 MG: 200 TABLET ORAL at 11:52

## 2024-08-22 RX ADMIN — CYANOCOBALAMIN TAB 1000 MCG 1000 MCG: 1000 TAB at 09:14

## 2024-08-22 RX ADMIN — GABAPENTIN 100 MG: 100 CAPSULE ORAL at 20:14

## 2024-08-22 RX ADMIN — SODIUM CHLORIDE 1 G: 1 TABLET ORAL at 09:14

## 2024-08-22 RX ADMIN — CEFTRIAXONE 1 G: 1 INJECTION, SOLUTION INTRAVENOUS at 18:25

## 2024-08-22 RX ADMIN — FOLIC ACID 1 MG: 1 TABLET ORAL at 09:14

## 2024-08-22 ASSESSMENT — LIFESTYLE VARIABLES
TOTAL SCORE: 0
HEADACHE, FULLNESS IN HEAD: NOT PRESENT
NAUSEA AND VOMITING: NO NAUSEA AND NO VOMITING
TOTAL SCORE: 0
AUDITORY DISTURBANCES: NOT PRESENT
ORIENTATION AND CLOUDING OF SENSORIUM: ORIENTED AND CAN DO SERIAL ADDITIONS
PAROXYSMAL SWEATS: NO SWEAT VISIBLE
NAUSEA AND VOMITING: NO NAUSEA AND NO VOMITING
TREMOR: NO TREMOR
AGITATION: SOMEWHAT MORE THAN NORMAL ACTIVITY
VISUAL DISTURBANCES: NOT PRESENT
AGITATION: NORMAL ACTIVITY
HEADACHE, FULLNESS IN HEAD: NOT PRESENT
TOTAL SCORE: 0
PAROXYSMAL SWEATS: NO SWEAT VISIBLE
ORIENTATION AND CLOUDING OF SENSORIUM: ORIENTED AND CAN DO SERIAL ADDITIONS
NAUSEA AND VOMITING: NO NAUSEA AND NO VOMITING
PAROXYSMAL SWEATS: NO SWEAT VISIBLE
TREMOR: NO TREMOR
PAROXYSMAL SWEATS: NO SWEAT VISIBLE
ANXIETY: NO ANXIETY, AT EASE
HEADACHE, FULLNESS IN HEAD: NOT PRESENT
TREMOR: NO TREMOR
HEADACHE, FULLNESS IN HEAD: NOT PRESENT
AUDITORY DISTURBANCES: NOT PRESENT
TOTAL SCORE: 9
PAROXYSMAL SWEATS: NO SWEAT VISIBLE
AGITATION: NORMAL ACTIVITY
AGITATION: 2
ORIENTATION AND CLOUDING OF SENSORIUM: ORIENTED AND CAN DO SERIAL ADDITIONS
TOTAL SCORE: 5
AUDITORY DISTURBANCES: NOT PRESENT
ORIENTATION AND CLOUDING OF SENSORIUM: DISORIENTED FOR PLACE OR PERSON
ANXIETY: 3
NAUSEA AND VOMITING: NO NAUSEA AND NO VOMITING
PAROXYSMAL SWEATS: NO SWEAT VISIBLE
TREMOR: NO TREMOR
VISUAL DISTURBANCES: NOT PRESENT
AGITATION: NORMAL ACTIVITY
TREMOR: NO TREMOR
PAROXYSMAL SWEATS: NO SWEAT VISIBLE
TREMOR: NO TREMOR
ANXIETY: NO ANXIETY, AT EASE
TOTAL SCORE: 0
ANXIETY: NO ANXIETY, AT EASE
PAROXYSMAL SWEATS: NO SWEAT VISIBLE
AGITATION: NORMAL ACTIVITY
VISUAL DISTURBANCES: NOT PRESENT
AUDITORY DISTURBANCES: NOT PRESENT
ORIENTATION AND CLOUDING OF SENSORIUM: DISORIENTED FOR PLACE OR PERSON
HEADACHE, FULLNESS IN HEAD: NOT PRESENT
TREMOR: NO TREMOR
ANXIETY: NO ANXIETY, AT EASE
AUDITORY DISTURBANCES: NOT PRESENT
TREMOR: NO TREMOR
AUDITORY DISTURBANCES: NOT PRESENT
NAUSEA AND VOMITING: NO NAUSEA AND NO VOMITING
TOTAL SCORE: 0
VISUAL DISTURBANCES: NOT PRESENT
NAUSEA AND VOMITING: NO NAUSEA AND NO VOMITING
ORIENTATION AND CLOUDING OF SENSORIUM: ORIENTED AND CAN DO SERIAL ADDITIONS
PAROXYSMAL SWEATS: NO SWEAT VISIBLE
ORIENTATION AND CLOUDING OF SENSORIUM: ORIENTED AND CAN DO SERIAL ADDITIONS
AGITATION: NORMAL ACTIVITY
NAUSEA AND VOMITING: NO NAUSEA AND NO VOMITING
AUDITORY DISTURBANCES: NOT PRESENT
AUDITORY DISTURBANCES: NOT PRESENT
ANXIETY: NO ANXIETY, AT EASE
AUDITORY DISTURBANCES: NOT PRESENT
HEADACHE, FULLNESS IN HEAD: NOT PRESENT
TOTAL SCORE: 0
ANXIETY: NO ANXIETY, AT EASE
VISUAL DISTURBANCES: NOT PRESENT
TOTAL SCORE: 0
AGITATION: NORMAL ACTIVITY
NAUSEA AND VOMITING: NO NAUSEA AND NO VOMITING
AUDITORY DISTURBANCES: NOT PRESENT
VISUAL DISTURBANCES: NOT PRESENT
ANXIETY: NO ANXIETY, AT EASE
NAUSEA AND VOMITING: NO NAUSEA AND NO VOMITING
PAROXYSMAL SWEATS: NO SWEAT VISIBLE
ORIENTATION AND CLOUDING OF SENSORIUM: ORIENTED AND CAN DO SERIAL ADDITIONS
AGITATION: NORMAL ACTIVITY
ANXIETY: NO ANXIETY, AT EASE
TOTAL SCORE: 0
HEADACHE, FULLNESS IN HEAD: NOT PRESENT
VISUAL DISTURBANCES: NOT PRESENT
AGITATION: NORMAL ACTIVITY
ORIENTATION AND CLOUDING OF SENSORIUM: ORIENTED AND CAN DO SERIAL ADDITIONS
VISUAL DISTURBANCES: NOT PRESENT
NAUSEA AND VOMITING: NO NAUSEA AND NO VOMITING
ORIENTATION AND CLOUDING OF SENSORIUM: ORIENTED AND CAN DO SERIAL ADDITIONS
HEADACHE, FULLNESS IN HEAD: NOT PRESENT
TREMOR: NO TREMOR
ANXIETY: NO ANXIETY, AT EASE
VISUAL DISTURBANCES: NOT PRESENT
TREMOR: NO TREMOR
HEADACHE, FULLNESS IN HEAD: NOT PRESENT
VISUAL DISTURBANCES: NOT PRESENT
HEADACHE, FULLNESS IN HEAD: NOT PRESENT

## 2024-08-22 ASSESSMENT — PAIN - FUNCTIONAL ASSESSMENT: PAIN_FUNCTIONAL_ASSESSMENT: 0-10

## 2024-08-22 ASSESSMENT — COGNITIVE AND FUNCTIONAL STATUS - GENERAL
DAILY ACTIVITIY SCORE: 19
MOVING TO AND FROM BED TO CHAIR: A LITTLE
DAILY ACTIVITIY SCORE: 17
MOVING TO AND FROM BED TO CHAIR: A LOT
WALKING IN HOSPITAL ROOM: A LITTLE
DRESSING REGULAR UPPER BODY CLOTHING: A LITTLE
DRESSING REGULAR LOWER BODY CLOTHING: A LITTLE
PERSONAL GROOMING: A LITTLE
STANDING UP FROM CHAIR USING ARMS: A LOT
WALKING IN HOSPITAL ROOM: A LOT
CLIMB 3 TO 5 STEPS WITH RAILING: A LOT
EATING MEALS: A LITTLE
HELP NEEDED FOR BATHING: A LITTLE
STANDING UP FROM CHAIR USING ARMS: A LITTLE
CLIMB 3 TO 5 STEPS WITH RAILING: A LOT
MOBILITY SCORE: 19
DRESSING REGULAR UPPER BODY CLOTHING: A LITTLE
MOBILITY SCORE: 16
TOILETING: A LOT
DRESSING REGULAR LOWER BODY CLOTHING: A LITTLE
TOILETING: A LOT
HELP NEEDED FOR BATHING: A LITTLE

## 2024-08-22 ASSESSMENT — PAIN DESCRIPTION - ORIENTATION: ORIENTATION: RIGHT

## 2024-08-22 ASSESSMENT — PAIN SCALES - GENERAL
PAINLEVEL_OUTOF10: 0 - NO PAIN
PAINLEVEL_OUTOF10: 8

## 2024-08-22 NOTE — CARE PLAN
The patient's goals for the shift include      The clinical goals for the shift include Patient will be more oriented by the end of this shift    See nurses note

## 2024-08-22 NOTE — SIGNIFICANT EVENT
Patient is unable to be fully assessed at this time for functional assessments. She does not follow commands appropriately.    She is awake and alert, she will squeeze your hands. She was not able to wiggle toes although it appeared that she was trying. It seems that she is not able to fully control the constant moving of her legs.     Potassium was critical at 2.9. NP notified and orders were obtained. Potassium is infusing per orders. Na is up to 120, IVF rate lowered to 75ml/hr.    Through the night, the patient has started to improve. She is starting to answer more appropriately. She states her name. She is talking and is being reoriented to the situation.    BP has been high, NP was aware and feels it is due to the alcohol withdrawal. CIWA at the highest was 10. Medicated x2 with PRN ativan. No seizure activity.

## 2024-08-22 NOTE — H&P
History of Present Illness  Nima Chan is a 69 y.o. female  with PMHx significant for ETOH abuse, nicotine dependence, HTN, HLD, MVP, marijuana, and seizures who presented to [facility] due to altered mental status and found with hyponatremia sodium 120 and hypokalemia K+ 2.9. Rabdo is noted on labwork as her CK was 366. Leulocytosis with WBC 15.0 and CXR with bronchial thickening. Right clavicle fracture is noted and a sling placed to assist with immobilization. Nima did not follow any commands in the ED and rather thrashed about without reason. She was given normal saline bolus and drip and potassium supplementation in the ED.     12 Point ROS: Unable to obtain from patient 2/2 non-verbal.    She was initiated on Rocephin and azithromycin for the leukocytosis and abnormal imaging. CIWA protocol initiated. Repeat  and thus IVF NS decreased from 125 to 75/hr. KCL again replaced with IV potassium as she was unable to swallow. 1:1 sitter overnight. Mentation slowly improved this am and is oriented to the situation. Son and patient asking when she can go home.   Patient expressed that she was playing with children in the yard about one week ago and fell resulting in the injury to the right clavicle. She is unable to recall the events after going to bed at about 2300 the previous day before being taken to the ED.      Past Medical History  Past Medical History:   Diagnosis Date    Personal history of other diseases of the circulatory system     History of hypertension    Personal history of transient ischemic attack (TIA), and cerebral infarction without residual deficits     History of stroke       Surgical History  Past Surgical History:   Procedure Laterality Date    MR HEAD ANGIO WO IV CONTRAST  2/13/2023    MR HEAD ANGIO WO IV CONTRAST 2/13/2023 [facility]    MR NECK ANGIO WO IV CONTRAST  2/13/2023    MR NECK ANGIO WO IV CONTRAST 2/13/2023 [facility]    OTHER SURGICAL HISTORY   "01/30/2020    Tonsillectomy    OTHER SURGICAL HISTORY  01/30/2020    Tubal ligation    OTHER SURGICAL HISTORY  01/30/2020    Complete colonoscopy    TONSILLECTOMY  09/12/2018    Tonsillectomy    TUBAL LIGATION  09/12/2018    Tubal Ligation        Social History  She reports that she has been smoking cigarettes. She has been exposed to tobacco smoke. She has never used smokeless tobacco. She reports current alcohol use. She reports that she does not currently use drugs after having used the following drugs: Marijuana.    Allergies  Patient has no known allergies.     Physical Exam  Constitutional:       Appearance: She is cachectic. She is ill-appearing.   HENT:      Head: Atraumatic.      Nose: Nose normal.      Mouth/Throat:      Mouth: Mucous membranes are moist.   Eyes:      Pupils: Pupils are equal, round, and reactive to light.   Cardiovascular:      Rate and Rhythm: Regular rhythm. Tachycardia present.      Pulses: Normal pulses.   Pulmonary:      Effort: Pulmonary effort is normal.      Breath sounds: Normal breath sounds.   Abdominal:      General: Bowel sounds are normal.      Palpations: Abdomen is soft.   Musculoskeletal:         General: Swelling (right shoulder), tenderness (right shoulder) and signs of injury present.   Skin:     General: Skin is warm and dry.      Capillary Refill: Capillary refill takes less than 2 seconds.   Neurological:      Mental Status: She is alert and oriented to person, place, and time.      Motor: Weakness present.      Comments: Improved from severely altered and nonverbal/unable to follow commands to alert and oriented following morning   Psychiatric:         Mood and Affect: Mood normal.         Behavior: Behavior normal.          Last Recorded Vitals  Blood pressure 150/82, pulse 80, temperature 36.6 °C (97.9 °F), temperature source Temporal, resp. rate 18, height 1.6 m (5' 3\"), weight 57.8 kg (127 lb 6.8 oz), SpO2 97%.    Relevant Results  Scheduled " medications  amLODIPine, 10 mg, oral, Daily  ascorbic acid, 1,000 mg, oral, Daily  aspirin, 81 mg, oral, Daily  azithromycin, 500 mg, oral, q24h MANNY  carBAMazepine, 100 mg, oral, BID  cefTRIAXone, 1 g, intravenous, q24h  cholecalciferol, 2,000 Units, oral, Daily  cyanocobalamin, 1,000 mcg, oral, Daily  enoxaparin, 40 mg, subcutaneous, q24h  FLUoxetine, 20 mg, oral, Daily  folic acid, 1 mg, oral, Daily  gabapentin, 100 mg, oral, TID  multivitamin, 1 tablet, oral, Daily  nicotine, 1 patch, transdermal, Daily  pantoprazole, 40 mg, oral, Daily before breakfast  polyethylene glycol, 17 g, oral, Daily  propranolol, 20 mg, oral, BID  sodium chloride, 1 g, oral, Daily  thiamine, 100 mg, oral, Daily      Continuous medications  sodium chloride 0.9%, 75 mL/hr, Last Rate: 75 mL/hr (08/22/24 1130)      PRN medications  PRN medications: acetaminophen, diazePAM, ketorolac, LORazepam **OR** LORazepam **OR** LORazepam, melatonin, ondansetron ODT **OR** ondansetron     Results for orders placed or performed during the hospital encounter of 08/21/24 (from the past 24 hour(s))   Basic metabolic panel   Result Value Ref Range    Glucose 174 (H) 74 - 99 mg/dL    Sodium 125 (L) 136 - 145 mmol/L    Potassium 2.9 (LL) 3.5 - 5.3 mmol/L    Chloride 94 (L) 98 - 107 mmol/L    Bicarbonate 24 21 - 32 mmol/L    Anion Gap 10 10 - 20 mmol/L    Urea Nitrogen 4 (L) 6 - 23 mg/dL    Creatinine 0.39 (L) 0.50 - 1.05 mg/dL    eGFR >90 >60 mL/min/1.73m*2    Calcium 8.3 (L) 8.6 - 10.3 mg/dL   CBC   Result Value Ref Range    WBC 10.4 4.4 - 11.3 x10*3/uL    nRBC 0.0 0.0 - 0.0 /100 WBCs    RBC 4.10 4.00 - 5.20 x10*6/uL    Hemoglobin 13.3 12.0 - 16.0 g/dL    Hematocrit 37.4 36.0 - 46.0 %    MCV 91 80 - 100 fL    MCH 32.4 26.0 - 34.0 pg    MCHC 35.6 32.0 - 36.0 g/dL    RDW 11.6 11.5 - 14.5 %    Platelets 518 (H) 150 - 450 x10*3/uL   Renal Function Panel   Result Value Ref Range    Glucose 101 (H) 74 - 99 mg/dL    Sodium 128 (L) 136 - 145 mmol/L    Potassium 3.1  (L) 3.5 - 5.3 mmol/L    Chloride 96 (L) 98 - 107 mmol/L    Bicarbonate 26 21 - 32 mmol/L    Anion Gap 9 (L) 10 - 20 mmol/L    Urea Nitrogen 4 (L) 6 - 23 mg/dL    Creatinine 0.43 (L) 0.50 - 1.05 mg/dL    eGFR >90 >60 mL/min/1.73m*2    Calcium 8.6 8.6 - 10.3 mg/dL    Phosphorus 2.8 2.5 - 4.9 mg/dL    Albumin 3.9 3.4 - 5.0 g/dL   Magnesium   Result Value Ref Range    Magnesium 1.68 1.60 - 2.40 mg/dL        Imaging  CT chest wo IV contrast    Result Date: 8/21/2024  STUDY: CT Chest without IV Contrast; 08/21/2024, 3:48 PM. INDICATION: Leukocytosis. COMPARISON: CXR: 07/30/23, 02/12/23, 06/17/22; CT chest: 12/27/21; CTA chest: 02/03/21; CT AP: 02/03/21. ACCESSION NUMBER(S): [id number] ORDERING CLINICIAN: BRYCE BELTRAN TECHNIQUE:  CT of the chest was performed without contrast.  Automated mA/kV exposure control was utilized and patient examination was performed in strict accordance with principles of ALARA. FINDINGS: MEDIASTINUM: The heart is normal in size without pericardial effusion.  Coronary artery calcifications are identified.  LUNGS/PLEURA: There is no pleural effusion, pleural thickening, or pneumothorax. The airways are patent. Mild chronic emphysematous changes are present.  There is a focal lesion in the anterior aspect of the right upper lobe consisting of small dilated air spaces and thickened septae with some adjacent chronic inflammatory change.  This lesion was present on the study of 12/27/2021.  There are calcified granulomas in both lower lobes. Linear atelectasis/chronic fibrotic changes seen at the lung bases, greater on right.  Peribronchial thickening is noted in the lower lobes, greater on the right, not present on the previous CT study.. LYMPH NODES: Thoracic lymph nodes are not enlarged. UPPER ABDOMEN: Upper abdomen demonstrates no acute pathology. BONES: There is an acute appearing severely comminuted fracture of the distal shaft of the right clavicle with overlying edema involving the  subcutis tissues and pectoralis muscle. Note is also made of multiple compression deformities in the thoracic spine which are relatively stable..  No suspicious bony lesions.    Acute appearing severely comminuted fracture of the right clavicle with overlying swelling of the pectoralis muscle and overlying subcutaneous tissues. Chronic lung disease and evidence of old granulomatous infection. Peribronchial thickening in both lower lobes, greater on the right, not seen on the prior CT of 12/27/2021.  Acute bronchial infection cannot be excluded.. Signed by Vasyl Joshua MD    CT head wo IV contrast    Result Date: 8/21/2024  Interpreted By:  Ingrid Chaudhary, STUDY: CT HEAD WO IV CONTRAST;  8/21/2024 1:13 pm   INDICATION: Signs/Symptoms:ams.   COMPARISON: 07/30/2023   ACCESSION NUMBER(S): [id number]   ORDERING CLINICIAN: COREY SANCHEZ   TECHNIQUE: Examination was performed in the axial plane with sagittal and coronal reconstructions.   FINDINGS: INTRACRANIAL: There is prominence of the ventricular system and cerebral sulci consistent with cerebral atrophy. No mass or mass effect is identified. There is no hemorrhage or subdural fluid collection. There is no acute infarct.     EXTRACRANIAL: There is mild mucosal thickening of the maxillary sinuses. There is mucosal thickening and opacification of multiple ethmoid air cells bilaterally. There is mild mucosal thickening of the left frontal sinus.       No acute intracranial pathology.   MACRO: None   Signed by: Ingrid Chaudhary 8/21/2024 1:20 PM Dictation workstation:   [id number]    ECG 12 lead    Result Date: 8/21/2024  Sinus tachycardia Biatrial enlargement Abnormal ECG When compared with ECG of 30-JUL-2023 19:55, Previous ECG has undetermined rhythm, needs review Non-specific change in ST segment in Inferior leads      Assessment/Plan    #Hyponatremia  ~~symptomatic with severe altered mental status  ~IVF bolus and gtt of NS adjusted for sodium  level  ~improved mentation to baseline within 24 hours  ~maintain IVF NS 75/hr for continued hydration    #Hypokalemia  ~K+ 2.9 replenished with IV potassium until able to tolerate oral  ~K+ 3.1 replenished  ~baseline takes sodium tablets at home for chronic hyponatremia  ~BMP in am    #ETOH misuse  ~chronic longstanding excessive alcohol use  ~suspect withdrawal   ~CIWA protocol utilized with good effect  ~mentation baseline this am and asking about going home    #Suspect URI  ~imaging with bronchial thickening  ~WBC 15 with LS  ~2/2 to acute dysphagia unable to r/o aspiration  ~rocephin and azithromycin initiated    #Nicotine dependence  ~nicotine patch initiated    #Right clavicle fracture  ~sling in place  ~NSAID for pain management  ~follow up with PCP after discharge    #Rhabdo  ~  ~likely 2/2 right clavicle trauma  ~monitor    #Seizure  ~no seizure activity reported by nursing  ~follows with neurology Dr. Diana  ~continue tegretol and gabapentin    #HTN, HLD  ~SBP >170  ~hydralazine PRN  ~resume norvasc    Disposition: Admitted 2/2 altered mental status 2/2 hyponatremia and alcohol withdrawal. Plan for discharge 8/23/24    I spent 60 minutes in the professional and overall care of this patient.      Rosey Simmons, APRN-CNP  Internal Medicine

## 2024-08-22 NOTE — CARE PLAN
The patient's goals for the shift include  be more alert and oriented.    The clinical goals for the shift include Patient will be more oriented by the end of this shift    Over the shift, the patient did not make progress toward the following goals. Barriers to progression include none. Recommendations to address these barriers include follow plan of care.

## 2024-08-22 NOTE — PROGRESS NOTES
08/22/24 4977   Discharge Planning   Living Arrangements Alone   Support Systems Children;Friends/neighbors   Type of Residence Private residence   Does the patient need discharge transport arranged? No     Met with patient and son at bedside to discuss discharge planning, son states would like to talk tomorrow about possibility of HHC at home, but wants to discuss with patient. Hx of fractured clavicle patient wearing sling on right arm. Patient presents with some confusion today, has had a sitter in the hospital, sitter no longer needed. Son states wants to talk to patient about HHC when her mind is clearer.

## 2024-08-23 PROBLEM — R79.89 ELEVATED TROPONIN: Status: RESOLVED | Noted: 2024-08-21 | Resolved: 2024-08-23

## 2024-08-23 PROBLEM — R41.82 ALTERED MENTAL STATUS: Status: RESOLVED | Noted: 2024-08-21 | Resolved: 2024-08-23

## 2024-08-23 LAB
ALBUMIN SERPL BCP-MCNC: 3.7 G/DL (ref 3.4–5)
ANION GAP SERPL CALC-SCNC: 10 MMOL/L (ref 10–20)
ATRIAL RATE: 102 BPM
BUN SERPL-MCNC: 4 MG/DL (ref 6–23)
CALCIUM SERPL-MCNC: 8.4 MG/DL (ref 8.6–10.3)
CHLORIDE SERPL-SCNC: 94 MMOL/L (ref 98–107)
CO2 SERPL-SCNC: 26 MMOL/L (ref 21–32)
CREAT SERPL-MCNC: 0.41 MG/DL (ref 0.5–1.05)
EGFRCR SERPLBLD CKD-EPI 2021: >90 ML/MIN/1.73M*2
ERYTHROCYTE [DISTWIDTH] IN BLOOD BY AUTOMATED COUNT: 11.8 % (ref 11.5–14.5)
GLUCOSE SERPL-MCNC: 103 MG/DL (ref 74–99)
HCT VFR BLD AUTO: 35.3 % (ref 36–46)
HGB BLD-MCNC: 12.5 G/DL (ref 12–16)
MAGNESIUM SERPL-MCNC: 1.5 MG/DL (ref 1.6–2.4)
MCH RBC QN AUTO: 32.5 PG (ref 26–34)
MCHC RBC AUTO-ENTMCNC: 35.4 G/DL (ref 32–36)
MCV RBC AUTO: 92 FL (ref 80–100)
NRBC BLD-RTO: 0 /100 WBCS (ref 0–0)
P AXIS: 83 DEGREES
P OFFSET: 186 MS
P ONSET: 121 MS
PHOSPHATE SERPL-MCNC: 3.6 MG/DL (ref 2.5–4.9)
PLATELET # BLD AUTO: 481 X10*3/UL (ref 150–450)
POTASSIUM SERPL-SCNC: 3.3 MMOL/L (ref 3.5–5.3)
PR INTERVAL: 198 MS
Q ONSET: 220 MS
QRS COUNT: 16 BEATS
QRS DURATION: 86 MS
QT INTERVAL: 360 MS
QTC CALCULATION(BAZETT): 469 MS
QTC FREDERICIA: 429 MS
R AXIS: 74 DEGREES
RBC # BLD AUTO: 3.85 X10*6/UL (ref 4–5.2)
SODIUM SERPL-SCNC: 127 MMOL/L (ref 136–145)
T AXIS: 85 DEGREES
T OFFSET: 400 MS
VENTRICULAR RATE: 102 BPM
WBC # BLD AUTO: 10.7 X10*3/UL (ref 4.4–11.3)

## 2024-08-23 PROCEDURE — 1200000002 HC GENERAL ROOM WITH TELEMETRY DAILY: Mod: IPSPLIT

## 2024-08-23 PROCEDURE — 2500000004 HC RX 250 GENERAL PHARMACY W/ HCPCS (ALT 636 FOR OP/ED): Mod: IPSPLIT

## 2024-08-23 PROCEDURE — 94760 N-INVAS EAR/PLS OXIMETRY 1: CPT | Mod: IPSPLIT

## 2024-08-23 PROCEDURE — 83735 ASSAY OF MAGNESIUM: CPT | Mod: IPSPLIT | Performed by: NURSE PRACTITIONER

## 2024-08-23 PROCEDURE — 2500000001 HC RX 250 WO HCPCS SELF ADMINISTERED DRUGS (ALT 637 FOR MEDICARE OP): Mod: IPSPLIT | Performed by: NURSE PRACTITIONER

## 2024-08-23 PROCEDURE — 2500000002 HC RX 250 W HCPCS SELF ADMINISTERED DRUGS (ALT 637 FOR MEDICARE OP, ALT 636 FOR OP/ED): Mod: IPSPLIT | Performed by: NURSE PRACTITIONER

## 2024-08-23 PROCEDURE — 2500000004 HC RX 250 GENERAL PHARMACY W/ HCPCS (ALT 636 FOR OP/ED): Mod: IPSPLIT | Performed by: NURSE PRACTITIONER

## 2024-08-23 PROCEDURE — 80069 RENAL FUNCTION PANEL: CPT | Mod: IPSPLIT | Performed by: NURSE PRACTITIONER

## 2024-08-23 PROCEDURE — S4991 NICOTINE PATCH NONLEGEND: HCPCS | Mod: IPSPLIT | Performed by: NURSE PRACTITIONER

## 2024-08-23 PROCEDURE — 36415 COLL VENOUS BLD VENIPUNCTURE: CPT | Mod: IPSPLIT | Performed by: NURSE PRACTITIONER

## 2024-08-23 PROCEDURE — 99232 SBSQ HOSP IP/OBS MODERATE 35: CPT | Performed by: NURSE PRACTITIONER

## 2024-08-23 PROCEDURE — 85027 COMPLETE CBC AUTOMATED: CPT | Mod: IPSPLIT | Performed by: NURSE PRACTITIONER

## 2024-08-23 RX ORDER — SODIUM CHLORIDE 9 MG/ML
INJECTION, SOLUTION INTRAVENOUS
Status: COMPLETED
Start: 2024-08-23 | End: 2024-08-23

## 2024-08-23 RX ORDER — SODIUM,POTASSIUM PHOSPHATES 280-250MG
1 POWDER IN PACKET (EA) ORAL
Status: DISCONTINUED | OUTPATIENT
Start: 2024-08-23 | End: 2024-08-24 | Stop reason: HOSPADM

## 2024-08-23 RX ORDER — LANOLIN ALCOHOL/MO/W.PET/CERES
400 CREAM (GRAM) TOPICAL DAILY
Status: DISCONTINUED | OUTPATIENT
Start: 2024-08-24 | End: 2024-08-24 | Stop reason: HOSPADM

## 2024-08-23 RX ORDER — LANOLIN ALCOHOL/MO/W.PET/CERES
800 CREAM (GRAM) TOPICAL ONCE
Status: COMPLETED | OUTPATIENT
Start: 2024-08-23 | End: 2024-08-23

## 2024-08-23 RX ADMIN — FOLIC ACID 1 MG: 1 TABLET ORAL at 09:50

## 2024-08-23 RX ADMIN — THIAMINE HCL TAB 100 MG 100 MG: 100 TAB at 09:50

## 2024-08-23 RX ADMIN — OXYCODONE HYDROCHLORIDE 5 MG: 5 TABLET ORAL at 17:05

## 2024-08-23 RX ADMIN — PROPRANOLOL HYDROCHLORIDE 20 MG: 10 TABLET ORAL at 20:36

## 2024-08-23 RX ADMIN — SODIUM CHLORIDE 75 ML/HR: 9 INJECTION, SOLUTION INTRAVENOUS at 01:08

## 2024-08-23 RX ADMIN — OXYCODONE HYDROCHLORIDE 5 MG: 5 TABLET ORAL at 04:36

## 2024-08-23 RX ADMIN — KETOROLAC TROMETHAMINE 15 MG: 30 INJECTION, SOLUTION INTRAMUSCULAR at 20:36

## 2024-08-23 RX ADMIN — CYANOCOBALAMIN TAB 1000 MCG 1000 MCG: 1000 TAB at 09:51

## 2024-08-23 RX ADMIN — THERA TABS 1 TABLET: TAB at 09:51

## 2024-08-23 RX ADMIN — POTASSIUM & SODIUM PHOSPHATES POWDER PACK 280-160-250 MG 1 PACKET: 280-160-250 PACK at 12:51

## 2024-08-23 RX ADMIN — NICOTINE 1 PATCH: 21 PATCH, EXTENDED RELEASE TRANSDERMAL at 01:03

## 2024-08-23 RX ADMIN — GABAPENTIN 100 MG: 100 CAPSULE ORAL at 09:50

## 2024-08-23 RX ADMIN — FLUOXETINE 20 MG: 10 CAPSULE ORAL at 09:50

## 2024-08-23 RX ADMIN — SODIUM CHLORIDE 1 G: 1 TABLET ORAL at 09:52

## 2024-08-23 RX ADMIN — AZITHROMYCIN DIHYDRATE 500 MG: 250 TABLET ORAL at 09:51

## 2024-08-23 RX ADMIN — OXYCODONE HYDROCHLORIDE AND ACETAMINOPHEN 1000 MG: 500 TABLET ORAL at 09:51

## 2024-08-23 RX ADMIN — POTASSIUM & SODIUM PHOSPHATES POWDER PACK 280-160-250 MG 1 PACKET: 280-160-250 PACK at 16:46

## 2024-08-23 RX ADMIN — OXYCODONE HYDROCHLORIDE 5 MG: 5 TABLET ORAL at 10:46

## 2024-08-23 RX ADMIN — SODIUM CHLORIDE 1000 ML: 9 INJECTION, SOLUTION INTRAVENOUS at 14:42

## 2024-08-23 RX ADMIN — Medication 800 MG: at 12:51

## 2024-08-23 RX ADMIN — PANTOPRAZOLE SODIUM 40 MG: 40 TABLET, DELAYED RELEASE ORAL at 06:04

## 2024-08-23 RX ADMIN — Medication 2000 UNITS: at 06:05

## 2024-08-23 RX ADMIN — AMLODIPINE BESYLATE 10 MG: 5 TABLET ORAL at 09:51

## 2024-08-23 RX ADMIN — CARBAMAZEPINE 100 MG: 200 TABLET ORAL at 22:13

## 2024-08-23 RX ADMIN — ENOXAPARIN SODIUM 40 MG: 40 INJECTION SUBCUTANEOUS at 16:46

## 2024-08-23 RX ADMIN — GABAPENTIN 100 MG: 100 CAPSULE ORAL at 20:36

## 2024-08-23 RX ADMIN — PROPRANOLOL HYDROCHLORIDE 20 MG: 10 TABLET ORAL at 09:51

## 2024-08-23 RX ADMIN — GABAPENTIN 100 MG: 100 CAPSULE ORAL at 14:32

## 2024-08-23 RX ADMIN — CARBAMAZEPINE 100 MG: 200 TABLET ORAL at 09:50

## 2024-08-23 RX ADMIN — ASPIRIN 81 MG: 81 TABLET, COATED ORAL at 09:51

## 2024-08-23 ASSESSMENT — PAIN - FUNCTIONAL ASSESSMENT
PAIN_FUNCTIONAL_ASSESSMENT: 0-10

## 2024-08-23 ASSESSMENT — LIFESTYLE VARIABLES
HEADACHE, FULLNESS IN HEAD: NOT PRESENT
ORIENTATION AND CLOUDING OF SENSORIUM: ORIENTED AND CAN DO SERIAL ADDITIONS
VISUAL DISTURBANCES: NOT PRESENT
AUDITORY DISTURBANCES: NOT PRESENT
TREMOR: NO TREMOR
NAUSEA AND VOMITING: NO NAUSEA AND NO VOMITING
AGITATION: NORMAL ACTIVITY
NAUSEA AND VOMITING: NO NAUSEA AND NO VOMITING
AUDITORY DISTURBANCES: NOT PRESENT
AGITATION: NORMAL ACTIVITY
NAUSEA AND VOMITING: NO NAUSEA AND NO VOMITING
AUDITORY DISTURBANCES: NOT PRESENT
TOTAL SCORE: 0
HEADACHE, FULLNESS IN HEAD: NOT PRESENT
TREMOR: NO TREMOR
ANXIETY: NO ANXIETY, AT EASE
NAUSEA AND VOMITING: NO NAUSEA AND NO VOMITING
ANXIETY: NO ANXIETY, AT EASE
VISUAL DISTURBANCES: NOT PRESENT
TREMOR: NO TREMOR
ORIENTATION AND CLOUDING OF SENSORIUM: ORIENTED AND CAN DO SERIAL ADDITIONS
TOTAL SCORE: 0
AGITATION: NORMAL ACTIVITY
PAROXYSMAL SWEATS: NO SWEAT VISIBLE
PAROXYSMAL SWEATS: NO SWEAT VISIBLE
ANXIETY: NO ANXIETY, AT EASE
TOTAL SCORE: 0
AUDITORY DISTURBANCES: NOT PRESENT
TREMOR: NO TREMOR
ANXIETY: NO ANXIETY, AT EASE
AGITATION: NORMAL ACTIVITY
VISUAL DISTURBANCES: NOT PRESENT
PAROXYSMAL SWEATS: NO SWEAT VISIBLE
PAROXYSMAL SWEATS: NO SWEAT VISIBLE
VISUAL DISTURBANCES: NOT PRESENT
ORIENTATION AND CLOUDING OF SENSORIUM: ORIENTED AND CAN DO SERIAL ADDITIONS
TOTAL SCORE: 0
ORIENTATION AND CLOUDING OF SENSORIUM: ORIENTED AND CAN DO SERIAL ADDITIONS

## 2024-08-23 ASSESSMENT — PAIN SCALES - GENERAL
PAINLEVEL_OUTOF10: 7
PAINLEVEL_OUTOF10: 7
PAINLEVEL_OUTOF10: 6
PAINLEVEL_OUTOF10: 0 - NO PAIN

## 2024-08-23 ASSESSMENT — PAIN DESCRIPTION - ORIENTATION
ORIENTATION: RIGHT
ORIENTATION: RIGHT

## 2024-08-23 ASSESSMENT — PAIN DESCRIPTION - LOCATION: LOCATION: SHOULDER

## 2024-08-23 NOTE — CARE PLAN
The patient's goals for the shift include      The clinical goals for the shift include Patient to have controlled pain less then 4 by the end of the shift    The patient had an uneventful shift. She is A&Ox4, she is now ambulating to the bathroom, but did have 1 episode of incontinence. She was c/o a lot of pain last evening 8/10, NP updated and oxycodone was ordered and admin x2 this shift. Pt states it was very effective. Rt clavicle area remains very swollen and is bruised. Arm remains in a sling. VSS

## 2024-08-23 NOTE — CARE PLAN
The patient's goals for the shift include      The clinical goals for the shift include patient will have controlled pain less than 4 by end of shift    Over the shift, the patient did not make progress toward the following goals. Barriers to progression include alcohol history. Recommendations to address these barriers include reeducate.

## 2024-08-23 NOTE — PROGRESS NOTES
Nima Chan is a 69 y.o. female on day 1 of admission presenting with Hyponatremia.      Subjective   Patient is seen in her room in no acute distress.   Electrolyte imbalance continues so ordered additional KCL  and magnesium.   Discussed with patient the amount of alcohol she drinks. Expressed six beers daily.   Discussed with patient electrolyte imbalances and alcohol.        Objective     Last Recorded Vitals  /84 (BP Location: Left arm, Patient Position: Lying)   Pulse 65   Temp 36.6 °C (97.8 °F) (Temporal)   Resp 18   Wt 57.8 kg (127 lb 6.8 oz)   SpO2 99%   Intake/Output last 3 Shifts:    Intake/Output Summary (Last 24 hours) at 8/23/2024 1320  Last data filed at 8/23/2024 0900  Gross per 24 hour   Intake 1464 ml   Output 600 ml   Net 864 ml       Admission Weight  Weight: 60.8 kg (134 lb) (08/21/24 1242)    Daily Weight  08/21/24 : 57.8 kg (127 lb 6.8 oz)    Image Results  ECG 12 lead  Sinus tachycardia  Biatrial enlargement  Abnormal ECG  When compared with ECG of 30-JUL-2023 19:55,  Previous ECG has undetermined rhythm, needs review  Non-specific change in ST segment in Inferior leads  See ED provider note for full interpretation and clinical correlation  Confirmed by Eve Drummond ([id number]) on 8/23/2024 1:14:44 PM    Results for orders placed or performed during the hospital encounter of 08/21/24 (from the past 24 hour(s))   CBC   Result Value Ref Range    WBC 10.7 4.4 - 11.3 x10*3/uL    nRBC 0.0 0.0 - 0.0 /100 WBCs    RBC 3.85 (L) 4.00 - 5.20 x10*6/uL    Hemoglobin 12.5 12.0 - 16.0 g/dL    Hematocrit 35.3 (L) 36.0 - 46.0 %    MCV 92 80 - 100 fL    MCH 32.5 26.0 - 34.0 pg    MCHC 35.4 32.0 - 36.0 g/dL    RDW 11.8 11.5 - 14.5 %    Platelets 481 (H) 150 - 450 x10*3/uL   Renal Function Panel   Result Value Ref Range    Glucose 103 (H) 74 - 99 mg/dL    Sodium 127 (L) 136 - 145 mmol/L    Potassium 3.3 (L) 3.5 - 5.3 mmol/L    Chloride 94 (L) 98 - 107 mmol/L    Bicarbonate 26 21 - 32 mmol/L    Anion Gap  10 10 - 20 mmol/L    Urea Nitrogen 4 (L) 6 - 23 mg/dL    Creatinine 0.41 (L) 0.50 - 1.05 mg/dL    eGFR >90 >60 mL/min/1.73m*2    Calcium 8.4 (L) 8.6 - 10.3 mg/dL    Phosphorus 3.6 2.5 - 4.9 mg/dL    Albumin 3.7 3.4 - 5.0 g/dL   Magnesium   Result Value Ref Range    Magnesium 1.50 (L) 1.60 - 2.40 mg/dL     Physical Exam  Constitutional:       Appearance: She is cachectic. She is ill-appearing.   HENT:      Head: Atraumatic.      Nose: Nose normal.      Mouth/Throat:      Mouth: Mucous membranes are moist.   Eyes:      Pupils: Pupils are equal, round, and reactive to light.   Cardiovascular:      Rate and Rhythm: Regular rhythm. Tachycardia present.      Pulses: Normal pulses.   Pulmonary:      Effort: Pulmonary effort is normal.      Breath sounds: Normal breath sounds.   Abdominal:      General: Bowel sounds are normal.      Palpations: Abdomen is soft.   Musculoskeletal:         General: Swelling (right shoulder), tenderness (right shoulder) and signs of injury present.   Skin:     General: Skin is warm and dry.      Capillary Refill: Capillary refill takes less than 2 seconds.   Neurological:      Mental Status: She is alert and oriented to person, place, and time.      Motor: Weakness present.      Comments: Improved from severely altered and nonverbal/unable to follow commands to alert and oriented following morning   Psychiatric:         Mood and Affect: Mood normal.         Behavior: Behavior normal.      Scheduled medications  amLODIPine, 10 mg, oral, Daily  ascorbic acid, 1,000 mg, oral, Daily  aspirin, 81 mg, oral, Daily  azithromycin, 500 mg, oral, q24h MANNY  carBAMazepine, 100 mg, oral, BID  cholecalciferol, 2,000 Units, oral, Daily  cyanocobalamin, 1,000 mcg, oral, Daily  enoxaparin, 40 mg, subcutaneous, q24h  FLUoxetine, 20 mg, oral, Daily  folic acid, 1 mg, oral, Daily  gabapentin, 100 mg, oral, TID  [START ON 8/24/2024] magnesium oxide, 400 mg, oral, Daily  multivitamin, 1 tablet, oral,  Daily  nicotine, 1 patch, transdermal, Daily  pantoprazole, 40 mg, oral, Daily before breakfast  polyethylene glycol, 17 g, oral, Daily  potassium, sodium phosphates, 1 packet, oral, TID AC  propranolol, 20 mg, oral, BID  sodium chloride, 1 g, oral, Daily  thiamine, 100 mg, oral, Daily      Continuous medications     PRN medications  PRN medications: acetaminophen, ketorolac, melatonin, ondansetron ODT **OR** ondansetron, oxyCODONE    Assessment/Plan      #Hyponatremia  ~~symptomatic with severe altered mental status  ~IVF bolus and gtt of NS adjusted for sodium level  ~improved mentation to baseline within 24 hours  ~maintain IVF NS 75/hr for continued hydration     #Hypokalemia  ~K+ 2.9 replenished with IV potassium until able to tolerate oral  ~K+ 3.1 replenished  ~baseline takes sodium tablets at home for chronic hyponatremia  ~BMP in am     #ETOH misuse  ~chronic longstanding excessive alcohol use  ~suspect withdrawal   ~CIWA protocol utilized with good effect  ~mentation baseline this am and asking about going home     #Suspect URI  ~imaging with bronchial thickening  ~WBC 15 with LS  ~2/2 to acute dysphagia unable to r/o aspiration  ~rocephin and azithromycin initiated     #Nicotine dependence  ~nicotine patch initiated     #Right clavicle fracture  ~sling in place  ~NSAID for pain management  ~follow up with PCP after discharge     #Rhabdo  ~  ~likely 2/2 right clavicle trauma  ~monitor     #Seizure  ~no seizure activity reported by nursing  ~follows with neurology Dr. Diana  ~continue tegretol and gabapentin     #HTN, HLD  ~SBP >170  ~hydralazine PRN  ~resume norvasc     Disposition: Admitted 2/2 altered mental status 2/2 hyponatremia and alcohol withdrawal. Plan for discharge 8/24/24     I spent 45 minutes in the professional and overall care of this patient.    Rosey Simmons, APRN-CNP

## 2024-08-23 NOTE — PROGRESS NOTES
08/23/24 1538   Discharge Planning   Living Arrangements Alone   Support Systems Children   Assistance Needed pt is independent at home   Type of Residence Private residence   Expected Discharge Disposition Home   Does the patient need discharge transport arranged? No     Pt admits to daily 6 beers or more and told NP that she is not interested in cessation assist. She has a son that is available for informal support. She is documented by nurses as being independent in her room. She is wearing a sling on one arm from  a fall earlier this month. She is expected to return home tomorrow when her electrolytes are better balanced. JOEL Montoya

## 2024-08-24 VITALS
TEMPERATURE: 98.1 F | OXYGEN SATURATION: 99 % | RESPIRATION RATE: 16 BRPM | WEIGHT: 127.43 LBS | BODY MASS INDEX: 22.58 KG/M2 | HEIGHT: 63 IN | DIASTOLIC BLOOD PRESSURE: 89 MMHG | SYSTOLIC BLOOD PRESSURE: 155 MMHG | HEART RATE: 73 BPM

## 2024-08-24 LAB
ALBUMIN SERPL BCP-MCNC: 3.5 G/DL (ref 3.4–5)
ANION GAP SERPL CALC-SCNC: 8 MMOL/L (ref 10–20)
BUN SERPL-MCNC: 4 MG/DL (ref 6–23)
CALCIUM SERPL-MCNC: 8.3 MG/DL (ref 8.6–10.3)
CHLORIDE SERPL-SCNC: 95 MMOL/L (ref 98–107)
CO2 SERPL-SCNC: 29 MMOL/L (ref 21–32)
CREAT SERPL-MCNC: 0.45 MG/DL (ref 0.5–1.05)
EGFRCR SERPLBLD CKD-EPI 2021: >90 ML/MIN/1.73M*2
ERYTHROCYTE [DISTWIDTH] IN BLOOD BY AUTOMATED COUNT: 11.8 % (ref 11.5–14.5)
GLUCOSE SERPL-MCNC: 97 MG/DL (ref 74–99)
HCT VFR BLD AUTO: 33.5 % (ref 36–46)
HGB BLD-MCNC: 11.7 G/DL (ref 12–16)
MAGNESIUM SERPL-MCNC: 1.45 MG/DL (ref 1.6–2.4)
MCH RBC QN AUTO: 32.3 PG (ref 26–34)
MCHC RBC AUTO-ENTMCNC: 34.9 G/DL (ref 32–36)
MCV RBC AUTO: 93 FL (ref 80–100)
NRBC BLD-RTO: 0 /100 WBCS (ref 0–0)
PHOSPHATE SERPL-MCNC: 4.7 MG/DL (ref 2.5–4.9)
PLATELET # BLD AUTO: 484 X10*3/UL (ref 150–450)
POTASSIUM SERPL-SCNC: 3.3 MMOL/L (ref 3.5–5.3)
RBC # BLD AUTO: 3.62 X10*6/UL (ref 4–5.2)
SODIUM SERPL-SCNC: 129 MMOL/L (ref 136–145)
WBC # BLD AUTO: 7.5 X10*3/UL (ref 4.4–11.3)

## 2024-08-24 PROCEDURE — 2500000002 HC RX 250 W HCPCS SELF ADMINISTERED DRUGS (ALT 637 FOR MEDICARE OP, ALT 636 FOR OP/ED): Mod: IPSPLIT | Performed by: NURSE PRACTITIONER

## 2024-08-24 PROCEDURE — 36415 COLL VENOUS BLD VENIPUNCTURE: CPT | Mod: IPSPLIT | Performed by: NURSE PRACTITIONER

## 2024-08-24 PROCEDURE — S4991 NICOTINE PATCH NONLEGEND: HCPCS | Mod: IPSPLIT | Performed by: NURSE PRACTITIONER

## 2024-08-24 PROCEDURE — 2500000004 HC RX 250 GENERAL PHARMACY W/ HCPCS (ALT 636 FOR OP/ED): Mod: IPSPLIT | Performed by: NURSE PRACTITIONER

## 2024-08-24 PROCEDURE — 94760 N-INVAS EAR/PLS OXIMETRY 1: CPT | Mod: IPSPLIT

## 2024-08-24 PROCEDURE — 80069 RENAL FUNCTION PANEL: CPT | Mod: IPSPLIT | Performed by: NURSE PRACTITIONER

## 2024-08-24 PROCEDURE — 83735 ASSAY OF MAGNESIUM: CPT | Mod: IPSPLIT | Performed by: NURSE PRACTITIONER

## 2024-08-24 PROCEDURE — 2500000001 HC RX 250 WO HCPCS SELF ADMINISTERED DRUGS (ALT 637 FOR MEDICARE OP): Mod: IPSPLIT | Performed by: NURSE PRACTITIONER

## 2024-08-24 PROCEDURE — 85027 COMPLETE CBC AUTOMATED: CPT | Mod: IPSPLIT | Performed by: NURSE PRACTITIONER

## 2024-08-24 RX ORDER — PANTOPRAZOLE SODIUM 40 MG/1
40 TABLET, DELAYED RELEASE ORAL
Qty: 30 TABLET | Refills: 3 | Status: SHIPPED | OUTPATIENT
Start: 2024-08-24 | End: 2024-10-14 | Stop reason: WASHOUT

## 2024-08-24 RX ORDER — LANOLIN ALCOHOL/MO/W.PET/CERES
400 CREAM (GRAM) TOPICAL DAILY
Qty: 30 TABLET | Refills: 3 | Status: SHIPPED | OUTPATIENT
Start: 2024-08-24 | End: 2025-01-27 | Stop reason: SDUPTHER

## 2024-08-24 RX ORDER — SODIUM,POTASSIUM PHOSPHATES 280-250MG
1 POWDER IN PACKET (EA) ORAL
Qty: 30 PACKET | Refills: 0 | Status: SHIPPED | OUTPATIENT
Start: 2024-08-24

## 2024-08-24 RX ORDER — KETOROLAC TROMETHAMINE 10 MG/1
10 TABLET, FILM COATED ORAL EVERY 6 HOURS PRN
Qty: 20 TABLET | Refills: 0 | Status: SHIPPED | OUTPATIENT
Start: 2024-08-24 | End: 2024-08-26 | Stop reason: SDUPTHER

## 2024-08-24 RX ORDER — FOLIC ACID 1 MG/1
1 TABLET ORAL DAILY
Qty: 30 TABLET | Refills: 3 | Status: SHIPPED | OUTPATIENT
Start: 2024-08-24 | End: 2025-01-27 | Stop reason: SDUPTHER

## 2024-08-24 RX ORDER — BISMUTH SUBSALICYLATE 262 MG
1 TABLET,CHEWABLE ORAL DAILY
Qty: 30 TABLET | Refills: 3 | Status: SHIPPED | OUTPATIENT
Start: 2024-08-24

## 2024-08-24 RX ADMIN — SODIUM CHLORIDE 1 G: 1 TABLET ORAL at 08:52

## 2024-08-24 RX ADMIN — POTASSIUM & SODIUM PHOSPHATES POWDER PACK 280-160-250 MG 1 PACKET: 280-160-250 PACK at 06:10

## 2024-08-24 RX ADMIN — PANTOPRAZOLE SODIUM 40 MG: 40 TABLET, DELAYED RELEASE ORAL at 06:10

## 2024-08-24 RX ADMIN — KETOROLAC TROMETHAMINE 15 MG: 30 INJECTION, SOLUTION INTRAMUSCULAR at 04:20

## 2024-08-24 RX ADMIN — CARBAMAZEPINE 100 MG: 200 TABLET ORAL at 08:52

## 2024-08-24 RX ADMIN — FLUOXETINE 20 MG: 10 CAPSULE ORAL at 08:53

## 2024-08-24 RX ADMIN — THIAMINE HCL TAB 100 MG 100 MG: 100 TAB at 08:52

## 2024-08-24 RX ADMIN — ACETAMINOPHEN 650 MG: 325 TABLET ORAL at 00:41

## 2024-08-24 RX ADMIN — THERA TABS 1 TABLET: TAB at 08:53

## 2024-08-24 RX ADMIN — AMLODIPINE BESYLATE 10 MG: 5 TABLET ORAL at 08:53

## 2024-08-24 RX ADMIN — AZITHROMYCIN DIHYDRATE 500 MG: 250 TABLET ORAL at 08:52

## 2024-08-24 RX ADMIN — ACETAMINOPHEN 650 MG: 325 TABLET ORAL at 09:01

## 2024-08-24 RX ADMIN — KETOROLAC TROMETHAMINE 15 MG: 30 INJECTION, SOLUTION INTRAMUSCULAR at 09:31

## 2024-08-24 RX ADMIN — CYANOCOBALAMIN TAB 1000 MCG 1000 MCG: 1000 TAB at 08:53

## 2024-08-24 RX ADMIN — Medication 400 MG: at 08:52

## 2024-08-24 RX ADMIN — GABAPENTIN 100 MG: 100 CAPSULE ORAL at 08:53

## 2024-08-24 RX ADMIN — PROPRANOLOL HYDROCHLORIDE 20 MG: 10 TABLET ORAL at 08:52

## 2024-08-24 RX ADMIN — FOLIC ACID 1 MG: 1 TABLET ORAL at 08:52

## 2024-08-24 RX ADMIN — ASPIRIN 81 MG: 81 TABLET, COATED ORAL at 08:52

## 2024-08-24 RX ADMIN — OXYCODONE HYDROCHLORIDE AND ACETAMINOPHEN 1000 MG: 500 TABLET ORAL at 09:07

## 2024-08-24 RX ADMIN — NICOTINE 1 PATCH: 21 PATCH, EXTENDED RELEASE TRANSDERMAL at 00:39

## 2024-08-24 RX ADMIN — Medication 2000 UNITS: at 06:10

## 2024-08-24 ASSESSMENT — PAIN - FUNCTIONAL ASSESSMENT
PAIN_FUNCTIONAL_ASSESSMENT: 0-10
PAIN_FUNCTIONAL_ASSESSMENT: 0-10

## 2024-08-24 ASSESSMENT — LIFESTYLE VARIABLES
TOTAL SCORE: 0
AGITATION: NORMAL ACTIVITY
ORIENTATION AND CLOUDING OF SENSORIUM: ORIENTED AND CAN DO SERIAL ADDITIONS
AUDITORY DISTURBANCES: NOT PRESENT
TREMOR: NO TREMOR
ANXIETY: NO ANXIETY, AT EASE
HEADACHE, FULLNESS IN HEAD: NOT PRESENT
PAROXYSMAL SWEATS: NO SWEAT VISIBLE
VISUAL DISTURBANCES: NOT PRESENT
NAUSEA AND VOMITING: NO NAUSEA AND NO VOMITING

## 2024-08-24 ASSESSMENT — PAIN SCALES - GENERAL
PAINLEVEL_OUTOF10: 8
PAINLEVEL_OUTOF10: 3
PAINLEVEL_OUTOF10: 6
PAINLEVEL_OUTOF10: 0 - NO PAIN
PAINLEVEL_OUTOF10: 8

## 2024-08-24 NOTE — CARE PLAN
The patient's goals for the shift include      The clinical goals for the shift include Patient will rate pain less than 7/10 this shift    Patient in bed. Call bell in reach. Medicated for pain per MAR. No complaints of n/v, chest pain, sob, headache, dizziness. Standby assist for transfers. Answers questions appropriately.

## 2024-08-24 NOTE — DISCHARGE SUMMARY
Discharge Diagnosis  Hyponatremia  Altered mental status   Alcohol abuse  Hypokalemia    Issues Requiring Follow-Up  Hyponatremia  Alcohol abuse  Right clavicular fracture    Test Results on the day of discharge:  Results for orders placed or performed during the hospital encounter of 08/21/24 (from the past 24 hour(s))   Renal Function Panel   Result Value Ref Range    Glucose 97 74 - 99 mg/dL    Sodium 129 (L) 136 - 145 mmol/L    Potassium 3.3 (L) 3.5 - 5.3 mmol/L    Chloride 95 (L) 98 - 107 mmol/L    Bicarbonate 29 21 - 32 mmol/L    Anion Gap 8 (L) 10 - 20 mmol/L    Urea Nitrogen 4 (L) 6 - 23 mg/dL    Creatinine 0.45 (L) 0.50 - 1.05 mg/dL    eGFR >90 >60 mL/min/1.73m*2    Calcium 8.3 (L) 8.6 - 10.3 mg/dL    Phosphorus 4.7 2.5 - 4.9 mg/dL    Albumin 3.5 3.4 - 5.0 g/dL   Magnesium   Result Value Ref Range    Magnesium 1.45 (L) 1.60 - 2.40 mg/dL   CBC   Result Value Ref Range    WBC 7.5 4.4 - 11.3 x10*3/uL    nRBC 0.0 0.0 - 0.0 /100 WBCs    RBC 3.62 (L) 4.00 - 5.20 x10*6/uL    Hemoglobin 11.7 (L) 12.0 - 16.0 g/dL    Hematocrit 33.5 (L) 36.0 - 46.0 %    MCV 93 80 - 100 fL    MCH 32.3 26.0 - 34.0 pg    MCHC 34.9 32.0 - 36.0 g/dL    RDW 11.8 11.5 - 14.5 %    Platelets 484 (H) 150 - 450 x10*3/uL         Hospital Course:  69 y.o. female  with PMHx significant for ETOH abuse, nicotine dependence, HTN, HLD, MVP, marijuana, and seizures who presented to [facility] on 8/22/2024 due to altered mental status and found with hyponatremia sodium 120 and hypokalemia K+ 2.9. Rabdo is noted on labwork as her CK was 366. Leulocytosis with WBC 15.0 and CXR with bronchial thickening. Right clavicle fracture is noted and a sling placed to assist with immobilization.   When patient was examined at the floor she was confused and was staring in the space.    For Hyponatremia- IVF bolus and gtt of normal saline was given and her mental status start improving. She was continued on IV normal saline @ 75 ml per hour. Sodium level improved  to 129 on the day of discharge.    Potassium level also improved to 3.3 on the day of discharge.    For alcohol abuse she has been counseled at the time of discharge and was given MVI, Thiamine and folic acid. Patient and her son verbalized understanding of the risk associated with chronic alcohol abuse.    Right clavicular fracture- She will be discharged with right arm slight and follow up with your PCP.   Patient was given rocephin and azithromycin initially because of possibility of aspiration but she did well without any problem.    Pertinent Physical Exam At Time of Discharge  Physical Exam  Vitals and nursing note reviewed.   Constitutional:       General: She is not in acute distress.     Appearance: Normal appearance. She is not ill-appearing or toxic-appearing.   HENT:      Head: Normocephalic and atraumatic.      Nose: Nose normal.   Eyes:      General:         Right eye: No discharge.         Left eye: No discharge.      Extraocular Movements: Extraocular movements intact.      Conjunctiva/sclera: Conjunctivae normal.      Pupils: Pupils are equal, round, and reactive to light.   Cardiovascular:      Rate and Rhythm: Normal rate and regular rhythm.      Pulses: Normal pulses.      Heart sounds: Normal heart sounds. No murmur heard.     No gallop.   Pulmonary:      Effort: Pulmonary effort is normal. No respiratory distress.      Breath sounds: Normal breath sounds. No stridor. No wheezing, rhonchi or rales.   Abdominal:      General: Bowel sounds are normal.      Palpations: Abdomen is soft.      Tenderness: There is no abdominal tenderness.   Musculoskeletal:         General: Tenderness present. No deformity.      Cervical back: Normal range of motion and neck supple. No rigidity or tenderness.      Right lower leg: No edema.      Left lower leg: No edema.      Comments: ROM is restricted at right shoulder.   Skin:     General: Skin is warm.      Coloration: Skin is not jaundiced.      Findings:  Bruising and erythema present. No rash.   Neurological:      General: No focal deficit present.      Mental Status: She is alert and oriented to person, place, and time.      Cranial Nerves: No cranial nerve deficit.      Gait: Gait normal.   Psychiatric:         Mood and Affect: Mood normal.         Behavior: Behavior normal.         Thought Content: Thought content normal.         Judgment: Judgment normal.       Home Medications     Medication List      START taking these medications     folic acid 1 mg tablet; Commonly known as: Folvite; Take 1 tablet (1 mg)   by mouth once daily.   ketorolac 10 mg tablet; Commonly known as: Toradol; Take 1 tablet (10   mg) by mouth every 6 hours if needed for moderate pain (4 - 6) for up to 5   days.   magnesium oxide 400 mg (241.3 mg magnesium) tablet; Commonly known as:   Mag-Ox; Take 1 tablet (400 mg) by mouth once daily.   multivitamin tablet; Take 1 tablet by mouth once daily.   pantoprazole 40 mg EC tablet; Commonly known as: ProtoNix; Take 1 tablet   (40 mg) by mouth once daily in the morning. Take before meals. Do not   crush, chew, or split.   potassium, sodium phosphates 280-160-250 mg packet; Commonly known as:   Phos-NaK; Take 1 packet by mouth 3 times a day before meals.     CONTINUE taking these medications     amLODIPine 10 mg tablet; Commonly known as: Norvasc; TAKE 1 TABLET BY   MOUTH EVERY DAY   ascorbic acid 1,000 mg tablet; Commonly known as: Vitamin C   aspirin 81 mg EC tablet   carBAMazepine  mg 12 hr tablet; Commonly known as: TEGretol XR;   TAKE 1 TABLET BY MOUTH TWICE A DAY . DO NOT CRUSH, CHEW, OR SPLIT   cholecalciferol 50 mcg (2,000 unit) capsule; Commonly known as: Vitamin   D-3   cyanocobalamin (vitamin B-12) 1,000 mcg tablet extended release;   Commonly known as: Vitamin B-12   FLUoxetine 20 mg capsule; Commonly known as: PROzac; TAKE 1 CAPSULE BY   MOUTH EVERY DAY   gabapentin 100 mg capsule; Commonly known as: Neurontin; Take 1 capsule    (100 mg) by mouth 3 times a day.   propranolol 20 mg tablet; Commonly known as: Inderal; START W/ 1 TAB BY   MOUTH TWICE DAILY FOR 3 WKS, THEN CAN INCREASE TO 1 TAB 3 TIMES DAILY FOR   SYMPTOMS   sodium chloride 1,000 mg tablet; TAKE 1 TABLET (1 GRAM) BY MOUTH ONCE   DAILY   thiamine 100 mg tablet; Commonly known as: Vitamin B-1; Take 1 tablet   (100 mg) by mouth once daily.     STOP taking these medications     MULTI FOR HER ORAL   PROBIOTIC PLUS COLOSTRUM ORAL   zinc gluconate,zinc picolinate 30 mg capsule       Outpatient Follow-Up  Future Appointments   Date Time Provider Department Center   10/14/2024  1:00 PM Alexa Shipman, APRN-CNP [id number] Panchito Crook MD

## 2024-08-26 ENCOUNTER — DOCUMENTATION (OUTPATIENT)
Dept: PRIMARY CARE | Facility: CLINIC | Age: 69
End: 2024-08-26

## 2024-08-26 ENCOUNTER — OFFICE VISIT (OUTPATIENT)
Dept: PRIMARY CARE | Facility: CLINIC | Age: 69
End: 2024-08-26
Payer: MEDICARE

## 2024-08-26 VITALS
SYSTOLIC BLOOD PRESSURE: 186 MMHG | BODY MASS INDEX: 22.96 KG/M2 | HEART RATE: 73 BPM | DIASTOLIC BLOOD PRESSURE: 95 MMHG | WEIGHT: 129.6 LBS

## 2024-08-26 DIAGNOSIS — M25.511 ACUTE PAIN OF RIGHT SHOULDER: ICD-10-CM

## 2024-08-26 DIAGNOSIS — S42.001A CLOSED NONDISPLACED FRACTURE OF RIGHT CLAVICLE, UNSPECIFIED PART OF CLAVICLE, INITIAL ENCOUNTER: Primary | ICD-10-CM

## 2024-08-26 DIAGNOSIS — F10.90 ALCOHOL USE DISORDER: ICD-10-CM

## 2024-08-26 PROCEDURE — 1111F DSCHRG MED/CURRENT MED MERGE: CPT | Performed by: REGISTERED NURSE

## 2024-08-26 PROCEDURE — 3080F DIAST BP >= 90 MM HG: CPT | Performed by: REGISTERED NURSE

## 2024-08-26 PROCEDURE — 99496 TRANSJ CARE MGMT HIGH F2F 7D: CPT | Performed by: REGISTERED NURSE

## 2024-08-26 PROCEDURE — 1159F MED LIST DOCD IN RCRD: CPT | Performed by: REGISTERED NURSE

## 2024-08-26 PROCEDURE — 3077F SYST BP >= 140 MM HG: CPT | Performed by: REGISTERED NURSE

## 2024-08-26 RX ORDER — NALTREXONE HYDROCHLORIDE 50 MG/1
50 TABLET, FILM COATED ORAL DAILY
Qty: 90 TABLET | Refills: 3 | Status: SHIPPED | OUTPATIENT
Start: 2024-08-26 | End: 2025-08-26

## 2024-08-26 RX ORDER — KETOROLAC TROMETHAMINE 10 MG/1
10 TABLET, FILM COATED ORAL EVERY 6 HOURS PRN
Qty: 20 TABLET | Refills: 0 | Status: SHIPPED | OUTPATIENT
Start: 2024-08-26 | End: 2024-08-31

## 2024-08-26 NOTE — PROGRESS NOTES
"Subjective   Patient ID: Nima Chan is a 69 y.o. female who presents for Hospital Follow-up (Broke right collar bone last Wednesday night, last sizure was Wednesday night; discuss [facility]; ).    HPI     Nima Chan is a 69 y.o. female presenting today for follow-up after being discharged from the hospital 3 days ago. The main problem requiring admission was Seizure and hyponatremia . The discharge summary and/or TransitionalCare Management documentation was reviewed. Medication reconciliation was performed as indicated via the \"Ugo as Reviewed\" timestamp.    Nima Chan was contacted by Transitional Care Management services two days after her discharge. This encounter and supporting documentation was reviewed.     Fractured clavicle, right side, Had a seizure Wednesday night.   Overall doing well since being home. She is wearing her sling. Was told she did not need to see orthopedics.   Has follow up with neuro in September for seizures   Taking her sodium tablets, she has been drinking lots of water and could be diluting herself, she is cutting back on this.     She also admits to drinking up to 8 beers per night, she would like to try medication to help quit.    All other systems reviewed and negative for complaint unless stated above.          Review of Systems    Objective   BP (!) 186/95 (BP Location: Left arm, Patient Position: Sitting, BP Cuff Size: Adult)   Pulse 73   Wt 58.8 kg (129 lb 9.6 oz)   BMI 22.96 kg/m²     Physical Exam  Constitutional:       Appearance: Normal appearance.   Cardiovascular:      Rate and Rhythm: Normal rate and regular rhythm.   Pulmonary:      Effort: Pulmonary effort is normal.      Breath sounds: Normal breath sounds.   Musculoskeletal:         General: Swelling, tenderness and deformity present.      Comments: Bruising noted to right clavicle and shoulder   Limited ROM, in simple sling    Skin:     General: Skin is warm and dry.   Neurological:      Mental " Status: She is alert and oriented to person, place, and time. Mental status is at baseline.   Psychiatric:         Mood and Affect: Mood normal.         Behavior: Behavior normal.         Assessment/Plan   Diagnoses and all orders for this visit:  Closed nondisplaced fracture of right clavicle, unspecified part of clavicle, initial encounter  -     XR clavicle right; Future  Acute pain of right shoulder  -     ketorolac (Toradol) 10 mg tablet; Take 1 tablet (10 mg) by mouth every 6 hours if needed for moderate pain (4 - 6) for up to 5 days.  Alcohol use disorder  -     naltrexone (Depade) 50 mg tablet; Take 1 tablet (50 mg) by mouth once daily.  Follow up in October or sooner if needed

## 2024-08-26 NOTE — PROGRESS NOTES
Discharge Facility: [facility]   Discharge Diagnosis: Hyponatremia; Altered mental status, unspecified altered mental status type; Seizure disorder; Alcohol use disorder; Acute pain of right shoulder   Admission Date: 8/21/2024   Discharge Date: 8/24/2024     PCP Appointment Date: 8/26/2024  Specialist Appointment Date: TBD  Hospital Encounter and Summary Linked: Yes    This patient has a follow up scheduled with PCP within 2 business days of DC on 8/26/2024. This visit qualifies towards TCM outreach.

## 2024-08-26 NOTE — PATIENT INSTRUCTIONS
Take half tablet of the medication for drinking once daily for the first week, then increase to full tablet once daily       Get the xray done anytime for your clavicle fracture

## 2024-08-27 ENCOUNTER — HOSPITAL ENCOUNTER (OUTPATIENT)
Dept: RADIOLOGY | Facility: HOSPITAL | Age: 69
Discharge: HOME | End: 2024-08-27
Payer: MEDICARE

## 2024-08-27 DIAGNOSIS — S42.001A CLOSED NONDISPLACED FRACTURE OF RIGHT CLAVICLE, UNSPECIFIED PART OF CLAVICLE, INITIAL ENCOUNTER: ICD-10-CM

## 2024-08-27 PROCEDURE — 73000 X-RAY EXAM OF COLLAR BONE: CPT | Mod: RT

## 2024-08-28 DIAGNOSIS — S42.021A CLOSED DISPLACED FRACTURE OF SHAFT OF RIGHT CLAVICLE, INITIAL ENCOUNTER: Primary | ICD-10-CM

## 2024-08-29 ENCOUNTER — PATIENT OUTREACH (OUTPATIENT)
Dept: PRIMARY CARE | Facility: CLINIC | Age: 69
End: 2024-08-29
Payer: MEDICARE

## 2024-08-29 NOTE — PROGRESS NOTES
Call regarding appt. with PCP on (8/26/2024) after hospitalization.  At time of outreach call the patient feels as if their condition has (improved) since last visit. Asked how long she needs to keep her sling on. Noted the PCP had sent information over [employer] to the patient regarding need to fup with orthopedics and to continue to wear the sling. Patient verbalized understanding of this and was provided the information again so she had it written down as well.   Reviewed the PCP appointment with the pt and addressed any questions or concerns.

## 2024-08-31 DIAGNOSIS — R56.9 SEIZURE (MULTI): ICD-10-CM

## 2024-09-03 DIAGNOSIS — I63.9 CEREBROVASCULAR ACCIDENT (CVA), UNSPECIFIED MECHANISM (MULTI): Primary | ICD-10-CM

## 2024-09-03 RX ORDER — CARBAMAZEPINE 100 MG/1
TABLET, EXTENDED RELEASE ORAL
Qty: 60 TABLET | Refills: 1 | Status: SHIPPED | OUTPATIENT
Start: 2024-09-03

## 2024-09-07 PROBLEM — S42.001A CLOSED DISPLACED FRACTURE OF RIGHT CLAVICLE: Status: ACTIVE | Noted: 2024-09-07

## 2024-09-07 PROBLEM — T14.8XXA COMMINUTED FRACTURE: Status: ACTIVE | Noted: 2024-09-07

## 2024-09-11 ENCOUNTER — APPOINTMENT (OUTPATIENT)
Dept: ORTHOPEDIC SURGERY | Facility: CLINIC | Age: 69
End: 2024-09-11
Payer: MEDICARE

## 2024-09-11 ENCOUNTER — HOSPITAL ENCOUNTER (OUTPATIENT)
Dept: RADIOLOGY | Facility: HOSPITAL | Age: 69
Discharge: HOME | End: 2024-09-11
Payer: MEDICARE

## 2024-09-11 VITALS — WEIGHT: 130 LBS | BODY MASS INDEX: 22.2 KG/M2 | HEIGHT: 64 IN

## 2024-09-11 DIAGNOSIS — M25.511 RIGHT SHOULDER PAIN, UNSPECIFIED CHRONICITY: ICD-10-CM

## 2024-09-11 DIAGNOSIS — S42.021A CLOSED DISPLACED FRACTURE OF SHAFT OF RIGHT CLAVICLE, INITIAL ENCOUNTER: ICD-10-CM

## 2024-09-11 PROCEDURE — L3670 SO ACRO/CLAV CAN WEB PRE OTS: HCPCS | Performed by: ORTHOPAEDIC SURGERY

## 2024-09-11 PROCEDURE — 3008F BODY MASS INDEX DOCD: CPT | Performed by: ORTHOPAEDIC SURGERY

## 2024-09-11 PROCEDURE — 73000 X-RAY EXAM OF COLLAR BONE: CPT | Mod: RT

## 2024-09-11 PROCEDURE — 1159F MED LIST DOCD IN RCRD: CPT | Performed by: ORTHOPAEDIC SURGERY

## 2024-09-11 PROCEDURE — 1111F DSCHRG MED/CURRENT MED MERGE: CPT | Performed by: ORTHOPAEDIC SURGERY

## 2024-09-11 PROCEDURE — 73000 X-RAY EXAM OF COLLAR BONE: CPT | Mod: RIGHT SIDE | Performed by: RADIOLOGY

## 2024-09-11 PROCEDURE — 1125F AMNT PAIN NOTED PAIN PRSNT: CPT | Performed by: ORTHOPAEDIC SURGERY

## 2024-09-11 PROCEDURE — 1160F RVW MEDS BY RX/DR IN RCRD: CPT | Performed by: ORTHOPAEDIC SURGERY

## 2024-09-11 PROCEDURE — 99214 OFFICE O/P EST MOD 30 MIN: CPT | Performed by: ORTHOPAEDIC SURGERY

## 2024-09-11 RX ORDER — NAPROXEN 500 MG/1
500 TABLET ORAL 2 TIMES DAILY PRN
Qty: 60 TABLET | Refills: 0 | Status: SHIPPED | OUTPATIENT
Start: 2024-09-11 | End: 2024-10-11

## 2024-09-11 RX ORDER — TRAMADOL HYDROCHLORIDE 50 MG/1
50 TABLET ORAL EVERY 8 HOURS PRN
Qty: 30 TABLET | Refills: 0 | Status: SHIPPED | OUTPATIENT
Start: 2024-09-11 | End: 2024-09-21

## 2024-09-11 ASSESSMENT — PAIN - FUNCTIONAL ASSESSMENT: PAIN_FUNCTIONAL_ASSESSMENT: 0-10

## 2024-09-11 ASSESSMENT — PAIN SCALES - GENERAL: PAINLEVEL_OUTOF10: 8

## 2024-09-11 NOTE — PROGRESS NOTES
History of Present Illness:    69 y.o. female presents to clinic today for her right clavicle.  Patient states approximately 10 days ago she was chasing her grandchildren when she tripped and fell.  She states that she hit her right shoulder directly.  She had quite a bit of pain and noticed a bump over her collarbone.  She went to a  urgent care.  They took x-rays and told the patient that she had a midshaft clavicle fracture.  They gave her a sling and told her to follow-up with orthopedics.      Unfortunately, the patient admits she has a poor memory.  When reviewing through the chart, we discovered that she was seen in the emergency room and Alla Acevedo on August 21, 2024.  She was brought in because she had a seizure at that time.  Her  electrolytes were severely abnormal and she was found to have a white blood cell count of 15.  As a result they ordered a chest CAT scan that showed a displaced right clavicle fracture that up until that point of the CAT scan was not even known about.  This occurred 3 weeks ago.    Today she states that her pain is about an 8 out of 10.  She has been taking Aleve and Advil as needed.  She has been using the sling at all times.  She denies any radicular symptoms.  She does have some numbness and tingling in her right ring and long finger at the tips but nowhere else.  She has not had any surgeries on the right shoulder.  No neck pain.        Review of Systems:    GENERAL: Negative  GI: Negative  MUSCULOSKELETAL: See HPI  SKIN: Negative  NEURO:  Negative     Physical Exam:  Patients' self reported past medical history, medications, allergies, surgical history, family and social history as well as a 10 point review of systems has been documented in the new patient intake form and scanned into the patient's electronic medical record.  The intake form was reviewed by Dr Fisher during the office visit and signed by Dr. Fisher and the patient.  Pertinent findings are documented in the  HPI.    General Multi-System Physical Exam:  Constitutional  General appearance:  Alert, oriented, and in no acute distress.  Well developed, well nourished.  Head and Face  Head and face:  Normocephalic and atraumatic.  Ears, Nose, Mouth, and Throat  External inspection of ears and nose: Normal.  Eyes:  Pupils are equal and round.  Neck  Neck:  no neck mass was observed.  Pulmonary  Respiratory effort:  no respiratory distress.  Cardiovascular  Intact distal pulses.  Lymphatic  Palpation of lymph nodes in the affected extremity:  Normal.  Skin  Skin and subcutaneous tissue:  Normal skin color and pigmentation.  Normal skin turgor.  No rashes.  Neurologic  Sensation:  normal to light touch.  Psychiatric  Judgement and insight:  Intact.  Mood and affect:  Normal.  Musculoskeletal    Shoulder:  Examination of right shoulder demonstrates no tenderness to palpation to the sternoclavicular joint.  There is tenderness palpation over the midshaft of the clavicle.  There is ecchymosis noted. no tenderness to the AC Joint.  Patient can actively abduct the shoulder.  X-rays of the elbow and wrist motion were not irritable.  Radial pulse 2+ and palpable. SILT. UE is NVI.     Imaging:     Dr Fisher independently interpreted the patient's CT (performed by the Radiology department) by viewing the CT images and this is Dr. Fisher's personal interpretation:     CT from August 21, 2024 of the chest showing right displaced clavicle fracture.  X-rays of the patient were ordered by Dr Fisher and obtained today.  Dr Fisher personally reviewed the results of the x-rays.    In addition, Dr Fisher independently interpreted the patient's x-rays (performed by the Radiology department) by viewing the x-ray images and this is Dr. Fisher's personal interpretation:     Right displaced clavicle fracture without significant shortening    I had a long discussion with the patient and her friend that was present on office visit today in discussion  regards to her right clavicle fracture.  We talked about the fact that she does have medical comorbidities that would make surgery to the right shoulder more complicated with increased risks including her risk of seizures which could end up leading to further falls and the fixation if she had surgery on her clavicle pulling out and possibly wounds opening up.  We also talked about the fact that clavicle fractures generally heal relatively well nonoperatively.  The patient stated she would like to try nonoperative treatment and did not want surgery.  We will keep her in a sling for 2 more weeks we gave her prescription to start physical therapy for gentle range of motion in 2 weeks.  We gave her prescription for naproxen and tramadol to help her with her pain.  I will see her back in 4 weeks for new x-rays.    This is an acute injury complicated by right displaced clavicle fracture      Due to this patient's condition, they are at a moderate risk of morbidity from additional diagnostic testing / treatment.      To help them with their pain, I wrote them a prescription for prescription strength anti-inflammatories.  The patient was informed that there are risks of using nonsteroidal antiinflammatory (NSAID) medications.    Risks of NSAIDS include, but are not limited to, upset stomach, ulcers in the stomach and other places in the gastrointestinal tract, and a mild increase in cardiovascular risk as a result of the antiinflammatory medications.  In addition, there is an increased risk in bleeding as a result of the medications.    The patient was advised to stop taking the NSAIDs if they cause them to have an upset stomach.  NSAIDs are not supposed to be taken every day for more than a few weeks.  If they have any questions or problems with the antiinflammatory medications, they should stop taking the medication immediately and call the office.

## 2024-09-18 DIAGNOSIS — R39.9 UTI SYMPTOMS: ICD-10-CM

## 2024-09-19 ENCOUNTER — EVALUATION (OUTPATIENT)
Dept: PHYSICAL THERAPY | Facility: HOSPITAL | Age: 69
End: 2024-09-19
Payer: MEDICARE

## 2024-09-19 ENCOUNTER — LAB (OUTPATIENT)
Dept: LAB | Facility: LAB | Age: 69
End: 2024-09-19
Payer: MEDICARE

## 2024-09-19 DIAGNOSIS — R39.9 UTI SYMPTOMS: ICD-10-CM

## 2024-09-19 DIAGNOSIS — S42.021A CLOSED DISPLACED FRACTURE OF SHAFT OF RIGHT CLAVICLE, INITIAL ENCOUNTER: Primary | ICD-10-CM

## 2024-09-19 LAB
APPEARANCE UR: CLEAR
BILIRUB UR STRIP.AUTO-MCNC: NEGATIVE MG/DL
COLOR UR: NORMAL
GLUCOSE UR STRIP.AUTO-MCNC: NEGATIVE MG/DL
KETONES UR STRIP.AUTO-MCNC: NEGATIVE MG/DL
LEUKOCYTE ESTERASE UR QL STRIP.AUTO: NEGATIVE
NITRITE UR QL STRIP.AUTO: NEGATIVE
PH UR STRIP.AUTO: 7 [PH]
PROT UR STRIP.AUTO-MCNC: NEGATIVE MG/DL
RBC # UR STRIP.AUTO: NEGATIVE /UL
SP GR UR STRIP.AUTO: 1.01
UROBILINOGEN UR STRIP.AUTO-MCNC: <2 MG/DL

## 2024-09-19 PROCEDURE — 81003 URINALYSIS AUTO W/O SCOPE: CPT

## 2024-09-19 PROCEDURE — 97161 PT EVAL LOW COMPLEX 20 MIN: CPT | Mod: GP

## 2024-09-19 PROCEDURE — 87086 URINE CULTURE/COLONY COUNT: CPT

## 2024-09-19 ASSESSMENT — PATIENT HEALTH QUESTIONNAIRE - PHQ9
2. FEELING DOWN, DEPRESSED OR HOPELESS: SEVERAL DAYS
1. LITTLE INTEREST OR PLEASURE IN DOING THINGS: SEVERAL DAYS
10. IF YOU CHECKED OFF ANY PROBLEMS, HOW DIFFICULT HAVE THESE PROBLEMS MADE IT FOR YOU TO DO YOUR WORK, TAKE CARE OF THINGS AT HOME, OR GET ALONG WITH OTHER PEOPLE: SOMEWHAT DIFFICULT
SUM OF ALL RESPONSES TO PHQ9 QUESTIONS 1 AND 2: 2

## 2024-09-19 ASSESSMENT — ENCOUNTER SYMPTOMS
OCCASIONAL FEELINGS OF UNSTEADINESS: 0
LOSS OF SENSATION IN FEET: 0
DEPRESSION: 1

## 2024-09-19 NOTE — PROGRESS NOTES
Physical Therapy  Physical Therapy Orthopedic Evaluation    Patient Name: Nima Chan  MRN: [id number]  Encounter Date: 9/19/2024  Time Calculation  Start Time: 1345  Stop Time: 1425  Time Calculation (min): 40 min    PT Evaluation Time Entry  PT Evaluation (Low) Time Entry: 40           Insurance:  Visit number: 1 of TBD  Authorization info: auth required  Payor: [employer]ICARE / Plan: [employer] MEDICARE / Product Type: *No Product type* /     Current Problem  Problem List Items Addressed This Visit             ICD-10-CM       Musculoskeletal and Injuries    Closed displaced fracture of right clavicle - Primary S42.001A    Relevant Orders    Follow Up In Physical Therapy     1. Closed displaced fracture of shaft of right clavicle, initial encounter  Referral to Physical Therapy    Follow Up In Physical Therapy          General:  General  Reason for Referral: Broken R clavicle  Referred By: MURTAZA Fisher MD  Past Medical History Relevant to Rehab: PMHx significant for ETOH abuse, nicotine dependence, HTN, HLD, MVP, marijuana use, and seizure      Precautions:   Precautions  Medical Precautions: Seizure precautions  Precautions Comment: R broken clavicle    Medical History Form: Reviewed (scanned into chart)    Subjective:   Subjective   Chief Complaint: Patient presents to clinic w c/c of broken R clavicle. Pt states she fell while playing with her grand kids.  Onset Date: End of August  FELICITA: Fall    Current Condition:   Better    Pain:   Current: 7/10  Highest: 9/10 pain  Lowest: 4/10 pain  Location: R clavicle  Description: ache  Aggravating Factors:  Reaching Overhead, Reaching Behind Back, and Carrying  Relieving Factors:  Rest and Ice    Relevant Information (PMH & Previous Tests/Imaging): x-ray: Right displaced clavicle fracture without significant shortening     Previous Interventions/Treatments: None    Prior Level of Function (PLOF)  Patient previously independent with all  ADLs  Exercise/Physical Activity: none reported  Work/School: retired  Hobbies: none reported    Hand dominance: R handed    Patients Living Environment: Reviewed and no concern    Primary Language: English    Patient's Goal(s) for Therapy: to be able to use her R hand again    Red Flags: Do you have any of the following? Partially  Fever/chills, unexplained weight changes, dizziness/fainting, unexplained change in bowel or bladder functions, unexplained malaise or muscle weakness, night pain/sweats, numbness or tingling  Pt reporting dr is aware of everything reported    Objective:  Objective       ROM    Cervical AROM (Degrees)    Flexion: no limitation, no pain  Extension: limited by 25%, in pain   (L) Side Bend: no limitation, no pain  (R) Side Bend: no limitation, no pain  (L) Rotation: no limitation, no pain  (R) Rotation: no limitation, no pain      Shoulder AROM (Degrees)      (R)  (L)  Flexion: 123  154   Abduction: 120  135     Functional ER: T1  T3     Functional IR: T10  T7       Elbow AROM / PROM WNL           Strength Testing    Shoulder    (R)  (L)  Flexion: 4-/5  4+/5      Abduction: 4-/5  4+/5     ER:  4+/5  4+/5     IR:  4+/5  4+/5         Elbow    (R)  (L)  Flexion: 4+/5  4+/5       Extension: 4+/5  4+/5         Periscapular Musculature    (R)  (L)  Upper Traps: 4+/5  4+/5     Middle Traps: 4-/5  4-/5     Lower Traps: 4-/5  4-/5     Rhomboids: 4-/5  4-/5       Posture:  no major deviation    Palpation: TTP along middle shaft of R clavicle    Joint Mobility: NT d/t fx    Observation: large bump w swelling noted over mid R clavicle    Outcome Measures:  Extended quick dash: 131     EDUCATION:   Individual(s) Educated: patient  Education Provided: Home exercise program, plan of care, activity modifications, pain management, and injury pathology  Handout(s) Provided: Scanned into chart  Home Program:    Access Code: [email or web address]  URL: [email or web address]  Date:  09/19/2024  Prepared by: Basia Hannon    Exercises  - Standing Elbow Flexion Extension AROM  - 1-2 x daily - 7 x weekly - 2 sets - 10 reps  - Wall Zarate  - 1-2 x daily - 7 x weekly - 2 sets - 10 reps  - Standing Shoulder Flexion Full Range  - 1-2 x daily - 7 x weekly - 2 sets - 10 reps  - Shoulder Flexion Wall Slide with Towel  - 1-2 x daily - 7 x weekly - 2 sets - 10 reps  - Standing Shoulder Abduction AAROM with Dowel  - 1-2 x daily - 7 x weekly - 2 sets - 10 reps  **also edu on use of ice for swelling and pain relief, and to only perform exercises in pain free ROM at this time.    Risk and Benefits Discussed with Patient/Caregiver/Other: Yes   Patient/Caregiver Demonstrated Understanding: Yes   Plan of Care Discussed and Agreed Upon: Yes   Patient Response to Education: Patient/Caregiver verbalized understanding of information, Patient/Caregiver performed return demonstration of exercises/activities, and Patient/Caregiver asked appropriate questions    Assessment: Patient presents with signs and symptoms consistent with R shoulder ROM and strength deficits following R clavicle fx, resulting in limited participation in pain-free ADLs and inability to perform at their prior level of function. Skilled PT warranted to address the above stated impairments, so the patient can perform FA's without increased pain or difficulty.        Screening  Frequency  Date Last Completed   Spiritual and Cultural Beliefs   Screening  each visit or episode of care 9/19/2024   Falls Risk Screening  every ambulatory visit [unfilled]   Pain Screening  annually at primary care visit  8/23/2023   Domestic Violence screening  annually at primary care visit 9/19/2024   Elder Abuse Screening  annually at primary care visit 9/19/2024   Depression Screening  annually in the primary care setting 9/19/2024   Suicide Risk Screening  annually in the primary care setting 9/19/2024   Nutrition and Food Insecurity   Screening  at least  annually at primary care visit     Key Learner  annually in the primary care setting 9/19/2024   Drug Screen  9/11/2024  2:35 PM   Alcohol Screen  9/11/2024  2:35 PM   Advance Directive           Clinical Presentation: Stable and/or uncomplicated characteristics    Complexity: low    Plan: to improve ROM and then strengthen pt as she tolerates. According to referring provider's previous note, pt is to wear the sling for 1 more week from this date, and is to begin w gentle ROM exercises.  Treatment/Interventions: Education/ Instruction, Manual therapy, Neuromuscular re-education, Therapeutic activities, Therapeutic exercises  PT Plan: Skilled PT  PT Frequency: 1 time per week  Duration: 4 weeks  Onset Date: 08/21/24  Certification Period Start Date: 09/19/24  Certification Period End Date: 10/17/24  Number of Treatments Authorized: 1 of TBD  Rehab Potential: Good  Plan of Care Agreement: Patient    Goals: Set and discussed today  Active       PT Problem       Pt will improve R shld strength to equal  L or more as evidence of improved functional mobility.         Start:  09/19/24    Expected End:  10/17/24            Pt will improve R shld AROM to equal  L or more as evidence of improved functional mobility.         Start:  09/19/24    Expected End:  10/17/24            Pt will be indep and compliant in administering HEP        Start:  09/19/24    Expected End:  10/17/24            Pt will improve Quick Dash score by >20 points as evidence of improved functional ability.        Start:  09/19/24    Expected End:  10/17/24            Pt will report decreased pain to no more than </=3/10.        Start:  09/19/24    Expected End:  10/17/24            Patient Stated Goal is to decrease pain       Start:  09/19/24    Expected End:  10/17/24            Patient Stated Goal is to be able to use RUE again       Start:  09/19/24    Expected End:  10/17/24                Plan of care was developed with input and agreement by the  patient    Treatment Performed:  See CURLY Hannon, PT     moderate assist (50% patients effort)

## 2024-09-20 ENCOUNTER — TELEPHONE (OUTPATIENT)
Dept: PRIMARY CARE | Facility: CLINIC | Age: 69
End: 2024-09-20
Payer: MEDICARE

## 2024-09-20 NOTE — TELEPHONE ENCOUNTER
Nima called in with a complaint of low back pain, urinary fragrantly, discomfort and burning while urinating. Her UA came back clear, UC results are not back yet. She is asking if you would mind sending in a antibiotic. She has been dealing with these SX for a few days now.

## 2024-09-21 LAB — BACTERIA UR CULT: NORMAL

## 2024-09-23 DIAGNOSIS — N30.00 ACUTE CYSTITIS WITHOUT HEMATURIA: Primary | ICD-10-CM

## 2024-09-23 RX ORDER — NITROFURANTOIN 25; 75 MG/1; MG/1
100 CAPSULE ORAL 2 TIMES DAILY
Qty: 14 CAPSULE | Refills: 0 | Status: SHIPPED | OUTPATIENT
Start: 2024-09-23 | End: 2024-09-30

## 2024-09-25 DIAGNOSIS — E87.1 HYPONATREMIA: ICD-10-CM

## 2024-09-25 RX ORDER — SODIUM CHLORIDE 1 G/1
1 TABLET ORAL DAILY
Qty: 30 TABLET | Refills: 2 | Status: SHIPPED | OUTPATIENT
Start: 2024-09-25

## 2024-10-01 ENCOUNTER — TREATMENT (OUTPATIENT)
Dept: PHYSICAL THERAPY | Facility: HOSPITAL | Age: 69
End: 2024-10-01
Payer: MEDICARE

## 2024-10-01 DIAGNOSIS — S42.021A CLOSED DISPLACED FRACTURE OF SHAFT OF RIGHT CLAVICLE, INITIAL ENCOUNTER: ICD-10-CM

## 2024-10-01 PROCEDURE — 97110 THERAPEUTIC EXERCISES: CPT | Mod: GP

## 2024-10-01 ASSESSMENT — PAIN SCALES - GENERAL: PAINLEVEL_OUTOF10: 0 - NO PAIN

## 2024-10-01 ASSESSMENT — PAIN - FUNCTIONAL ASSESSMENT: PAIN_FUNCTIONAL_ASSESSMENT: 0-10

## 2024-10-01 NOTE — PROGRESS NOTES
Physical Therapy Treatment    Patient Name: Nima Chan  MRN: [id number]  Today's Date: 10/1/2024  Payor: [employer]ARE / Plan: [employer] MEDICARE / Product Type: *No Product type* /   Time Calculation  Start Time: 1425  Stop Time: 1507  Time Calculation (min): 42 min        PT Therapeutic Procedures Time Entry  Therapeutic Exercise Time Entry: 42           Current Problem  1. Closed displaced fracture of shaft of right clavicle, initial encounter  Follow Up In Physical Therapy        Problem List Items Addressed This Visit             ICD-10-CM       Musculoskeletal and Injuries    Closed displaced fracture of right clavicle S42.001A       General  Reason for Referral: Broken R clavicle  Referred By: MURTAZA Fisher MD  Past Medical History Relevant to Rehab: PMHx significant for ETOH abuse, nicotine dependence, HTN, HLD, MVP, marijuana use, and seizure  Subjective   Current Condition:   Better  Patient reports she did not do her exercises after eval, but states she has been using it as much as she can to keep her ROM. States she started a new medication and has been feeling a little dizzy as a side effect - encouraged pt to talk to her provider about medication if needed.    Performing HEP?: No    Precautions  Precautions  Medical Precautions: Seizure precautions  Precautions Comment: R broken clavicle  Pain  Pain Assessment: 0-10  0-10 (Numeric) Pain Score: 0 - No pain    Objective     Treatments:    Therapeutic Exercise  Therapeutic Exercise Activity 1: pulleys, 3min flexion, 3 min abd  Therapeutic Exercise Activity 2: ranger 1x20 flex and 1x20 abd  Therapeutic Exercise Activity 3: shld shrugs 3x10 w 3# DB  Therapeutic Exercise Activity 4: ball rolls on wall, x20 CW and CCW in both shld flex and abd  Therapeutic Exercise Activity 5: 1x20 OH press w wand       EDUCATION:   Individual(s) Educated: Patient  Education Provided: importance of HEP and activity  Handout(s) Provided: Scanned into  chart  Home Program: same as previous  Risk and Benefits Discussed with Patient/Caregiver/Other: Yes   Patient/Caregiver Demonstrated Understanding: Yes   Patient Response to Education: Patient/Caregiver verbalized understanding of information, Patient/Caregiver performed return demonstration of exercises/activities, and Patient/Caregiver asked appropriate questions    Assessment:   Pt reported difficulty opening new bottle of pills at home and minor R hand weakness, pt issued blue Eggsersizer and edu on different exercises to improve hand strength. Pt verbalized understanding. Pt tolerating exercises well, but had increased fatigue w all abd exercises. Pt given increased rest breaks as needed to allow shoulder to relax. Increased time spent discussing importance of HEP, and encouraging pt to talk to providers about medications and concerns over weight loss. Pt verbalizing understanding. Pt cont to demo deficits in R shoulder AROM and strength. Pt cont to demo deficits as stated, and requires cont skilled PT intervention to assist pt in returning to PLOF.        Plan: Continue with POC.   OP PT Plan  Treatment/Interventions: Education/ Instruction, Manual therapy, Neuromuscular re-education, Therapeutic activities, Therapeutic exercises  PT Plan: Skilled PT  PT Frequency: 1 time per week  Duration: 4 weeks  Onset Date: 08/21/24  Certification Period Start Date: 09/19/24  Certification Period End Date: 10/17/24  Number of Treatments Authorized: 2/4  Rehab Potential: Good  Plan of Care Agreement: Patient    Goals:  Active       PT Problem       Pt will improve R shld strength to equal  L or more as evidence of improved functional mobility.   (Progressing)       Start:  09/19/24    Expected End:  10/17/24            Pt will improve R shld AROM to equal  L or more as evidence of improved functional mobility.   (Progressing)       Start:  09/19/24    Expected End:  10/17/24            Pt will be indep and compliant in  administering HEP  (Progressing)       Start:  09/19/24    Expected End:  10/17/24            Pt will improve Quick Dash score by >20 points as evidence of improved functional ability.  (Progressing)       Start:  09/19/24    Expected End:  10/17/24            Pt will report decreased pain to no more than </=3/10.  (Progressing)       Start:  09/19/24    Expected End:  10/17/24            Patient Stated Goal is to decrease pain (Progressing)       Start:  09/19/24    Expected End:  10/17/24            Patient Stated Goal is to be able to use RUE again (Progressing)       Start:  09/19/24    Expected End:  10/17/24                 Basia Hannon, PT'

## 2024-10-09 ENCOUNTER — APPOINTMENT (OUTPATIENT)
Dept: ORTHOPEDIC SURGERY | Facility: CLINIC | Age: 69
End: 2024-10-09
Payer: MEDICARE

## 2024-10-09 ENCOUNTER — HOSPITAL ENCOUNTER (OUTPATIENT)
Dept: RADIOLOGY | Facility: HOSPITAL | Age: 69
Discharge: HOME | End: 2024-10-09
Payer: MEDICARE

## 2024-10-09 VITALS — WEIGHT: 119 LBS | HEIGHT: 64 IN | BODY MASS INDEX: 20.32 KG/M2

## 2024-10-09 DIAGNOSIS — S42.021A CLOSED DISPLACED FRACTURE OF SHAFT OF RIGHT CLAVICLE, INITIAL ENCOUNTER: ICD-10-CM

## 2024-10-09 PROCEDURE — 73000 X-RAY EXAM OF COLLAR BONE: CPT | Mod: RT

## 2024-10-09 RX ORDER — NAPROXEN 500 MG/1
500 TABLET ORAL 2 TIMES DAILY PRN
Qty: 60 TABLET | Refills: 0 | Status: SHIPPED | OUTPATIENT
Start: 2024-10-09 | End: 2024-11-08

## 2024-10-09 RX ORDER — NAPROXEN 500 MG/1
TABLET ORAL
Qty: 60 TABLET | Refills: 0 | Status: SHIPPED | OUTPATIENT
Start: 2024-10-09

## 2024-10-09 ASSESSMENT — PAIN SCALES - GENERAL
PAINLEVEL_OUTOF10: 3
PAINLEVEL_OUTOF10: 7

## 2024-10-09 ASSESSMENT — PAIN - FUNCTIONAL ASSESSMENT: PAIN_FUNCTIONAL_ASSESSMENT: 0-10

## 2024-10-09 NOTE — PROGRESS NOTES
2 months status post right clavicle displaced fracture.  The patient is doing extremely well.  Patient states she has almost no pain and demonstrates full active range of motion without pain.  She states she is already back to yard work excetra.  This is against my recommendations of wearing the sling.    Patients' self reported past medical history, medications, allergies, surgical history, family and social history as well as a 10 point review of systems has been documented in the new patient intake form and scanned into the patient's electronic medical record.  The intake form was reviewed by Dr Fisher during the office visit and signed by Dr. Fisher and the patient.  Pertinent findings are documented in the HPI.    General Multi-System Physical Exam:  Constitutional  General appearance:  Alert, oriented, and in no acute distress.  Well developed, well nourished.  Head and Face  Head and face:  Normocephalic and atraumatic.  Ears, Nose, Mouth, and Throat  External inspection of ears and nose: Normal.  Eyes:  Pupils are equal and round.  Neck  Neck:  no neck mass was observed.  Pulmonary  Respiratory effort:  no respiratory distress.  Cardiovascular  Intact distal pulses.  Lymphatic  Palpation of lymph nodes in the affected extremity:  Normal.  Skin  Skin and subcutaneous tissue:  Normal skin color and pigmentation.  Normal skin turgor.  No rashes.  Neurologic  Sensation:  normal to light touch.  Psychiatric  Judgement and insight:  Intact.  Mood and affect:  Normal.  Musculoskeletal  Right shoulder has mild tenderness to palpation mid clavicle with full range of motion right shoulder neurovasc intact right upper extremity    X-rays of the patient were ordered by Dr Fisher and obtained today.  Dr Fisher personally reviewed the results of the x-rays.    In addition, Dr Fisher independently interpreted the patient's x-rays (performed by the Radiology department) by viewing the x-ray images and this is Dr. Fisher's  personal interpretation:     Right clavicle displaced fracture with early signs of healing    I am very pleased with the patient is doing as well as she has but I advised her that she is doing way too much.  She needs to take it very easy or otherwise she could prevent this clavicle fracture from healing I spoke with her about it extensively today.  I will see her back in 6 weeks for new x-rays but she needs to take it easy so as to allow the right clavicle fracture to heal        This is an acute injury complicated by right clavicle displaced fracture in a patient with seizure disorder and other medical comorbidities    Due to this patient's condition, they are at a moderate risk of morbidity from additional diagnostic testing / treatment.

## 2024-10-11 ENCOUNTER — APPOINTMENT (OUTPATIENT)
Dept: PHYSICAL THERAPY | Facility: HOSPITAL | Age: 69
End: 2024-10-11
Payer: MEDICARE

## 2024-10-11 ENCOUNTER — DOCUMENTATION (OUTPATIENT)
Dept: PHYSICAL THERAPY | Facility: HOSPITAL | Age: 69
End: 2024-10-11

## 2024-10-11 RX ORDER — MODAFINIL 200 MG/1
200 TABLET ORAL DAILY
COMMUNITY

## 2024-10-11 RX ORDER — LEVETIRACETAM 750 MG/1
750 TABLET ORAL 2 TIMES DAILY
COMMUNITY
Start: 2024-09-24 | End: 2025-09-24

## 2024-10-11 NOTE — PROGRESS NOTES
Physical Therapy                 Therapy Communication Note    Patient Name: Nima Chan  MRN: [id number]  Department:   Room: Room/bed info not found  Today's Date: 10/11/2024     Discipline: Physical Therapy    Missed Time: No Show    Comment: Pt no-showed anita. No further anita scheduled, PSR will follow up as needed.
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
## 2024-10-14 ENCOUNTER — APPOINTMENT (OUTPATIENT)
Dept: PRIMARY CARE | Facility: CLINIC | Age: 69
End: 2024-10-14
Payer: MEDICARE

## 2024-10-14 VITALS — BODY MASS INDEX: 21.41 KG/M2 | HEIGHT: 64 IN | WEIGHT: 125.4 LBS

## 2024-10-14 DIAGNOSIS — E87.8 ELECTROLYTE IMBALANCE: ICD-10-CM

## 2024-10-14 DIAGNOSIS — E87.1 HYPONATREMIA: Primary | ICD-10-CM

## 2024-10-14 DIAGNOSIS — E53.8 VITAMIN B12 DEFICIENCY: ICD-10-CM

## 2024-10-14 PROCEDURE — 3008F BODY MASS INDEX DOCD: CPT | Performed by: REGISTERED NURSE

## 2024-10-14 PROCEDURE — 1160F RVW MEDS BY RX/DR IN RCRD: CPT | Performed by: REGISTERED NURSE

## 2024-10-14 PROCEDURE — 1159F MED LIST DOCD IN RCRD: CPT | Performed by: REGISTERED NURSE

## 2024-10-14 PROCEDURE — 99214 OFFICE O/P EST MOD 30 MIN: CPT | Performed by: REGISTERED NURSE

## 2024-10-14 NOTE — PATIENT INSTRUCTIONS
Check for Levetiracetam at home   If you don't have it call Dr. Johnson and ask if you need to continue

## 2024-10-16 ENCOUNTER — PATIENT OUTREACH (OUTPATIENT)
Dept: PRIMARY CARE | Facility: CLINIC | Age: 69
End: 2024-10-16
Payer: COMMERCIAL

## 2024-10-17 ENCOUNTER — LAB (OUTPATIENT)
Dept: LAB | Facility: LAB | Age: 69
End: 2024-10-17
Payer: COMMERCIAL

## 2024-10-17 DIAGNOSIS — E87.8 ELECTROLYTE IMBALANCE: ICD-10-CM

## 2024-10-17 DIAGNOSIS — E53.8 VITAMIN B12 DEFICIENCY: ICD-10-CM

## 2024-10-17 DIAGNOSIS — E87.1 HYPONATREMIA: ICD-10-CM

## 2024-10-17 LAB
ALBUMIN SERPL BCP-MCNC: 4.2 G/DL (ref 3.4–5)
ALP SERPL-CCNC: 184 U/L (ref 33–136)
ALT SERPL W P-5'-P-CCNC: 13 U/L (ref 7–45)
ANION GAP SERPL CALC-SCNC: 14 MMOL/L (ref 10–20)
AST SERPL W P-5'-P-CCNC: 16 U/L (ref 9–39)
BASOPHILS # BLD AUTO: 0.07 X10*3/UL (ref 0–0.1)
BASOPHILS NFR BLD AUTO: 0.7 %
BILIRUB SERPL-MCNC: 0.4 MG/DL (ref 0–1.2)
BUN SERPL-MCNC: 14 MG/DL (ref 6–23)
CALCIUM SERPL-MCNC: 9.2 MG/DL (ref 8.6–10.3)
CHLORIDE SERPL-SCNC: 84 MMOL/L (ref 98–107)
CO2 SERPL-SCNC: 29 MMOL/L (ref 21–32)
CREAT SERPL-MCNC: 0.64 MG/DL (ref 0.5–1.05)
EGFRCR SERPLBLD CKD-EPI 2021: >90 ML/MIN/1.73M*2
EOSINOPHIL # BLD AUTO: 0.25 X10*3/UL (ref 0–0.7)
EOSINOPHIL NFR BLD AUTO: 2.4 %
ERYTHROCYTE [DISTWIDTH] IN BLOOD BY AUTOMATED COUNT: 11.9 % (ref 11.5–14.5)
GLUCOSE SERPL-MCNC: 85 MG/DL (ref 74–99)
HCT VFR BLD AUTO: 37.5 % (ref 36–46)
HGB BLD-MCNC: 13 G/DL (ref 12–16)
IMM GRANULOCYTES # BLD AUTO: 0.02 X10*3/UL (ref 0–0.7)
IMM GRANULOCYTES NFR BLD AUTO: 0.2 % (ref 0–0.9)
LYMPHOCYTES # BLD AUTO: 3.73 X10*3/UL (ref 1.2–4.8)
LYMPHOCYTES NFR BLD AUTO: 35.6 %
MAGNESIUM SERPL-MCNC: 2.01 MG/DL (ref 1.6–2.4)
MCH RBC QN AUTO: 31.6 PG (ref 26–34)
MCHC RBC AUTO-ENTMCNC: 34.7 G/DL (ref 32–36)
MCV RBC AUTO: 91 FL (ref 80–100)
MONOCYTES # BLD AUTO: 0.74 X10*3/UL (ref 0.1–1)
MONOCYTES NFR BLD AUTO: 7.1 %
NEUTROPHILS # BLD AUTO: 5.68 X10*3/UL (ref 1.2–7.7)
NEUTROPHILS NFR BLD AUTO: 54 %
NRBC BLD-RTO: 0 /100 WBCS (ref 0–0)
PLATELET # BLD AUTO: 773 X10*3/UL (ref 150–450)
POTASSIUM SERPL-SCNC: 4.5 MMOL/L (ref 3.5–5.3)
PROT SERPL-MCNC: 6.7 G/DL (ref 6.4–8.2)
RBC # BLD AUTO: 4.12 X10*6/UL (ref 4–5.2)
SODIUM SERPL-SCNC: 122 MMOL/L (ref 136–145)
WBC # BLD AUTO: 10.5 X10*3/UL (ref 4.4–11.3)

## 2024-10-17 PROCEDURE — 85025 COMPLETE CBC W/AUTO DIFF WBC: CPT

## 2024-10-17 PROCEDURE — 83735 ASSAY OF MAGNESIUM: CPT

## 2024-10-17 PROCEDURE — 80053 COMPREHEN METABOLIC PANEL: CPT

## 2024-10-17 PROCEDURE — 82607 VITAMIN B-12: CPT

## 2024-10-18 LAB — VIT B12 SERPL-MCNC: 642 PG/ML (ref 211–911)

## 2024-10-21 DIAGNOSIS — E87.1 HYPONATREMIA: Primary | ICD-10-CM

## 2024-10-23 NOTE — PROGRESS NOTES
Spoke to patient over the phone. Hyponatremia still present, increase sodium to 2 tabs daily from one and recheck in 1 week. Patient states understanding.

## 2024-11-01 ENCOUNTER — LAB (OUTPATIENT)
Dept: LAB | Facility: LAB | Age: 69
End: 2024-11-01
Payer: COMMERCIAL

## 2024-11-01 DIAGNOSIS — E87.1 HYPONATREMIA: ICD-10-CM

## 2024-11-01 LAB
ANION GAP SERPL CALC-SCNC: 11 MMOL/L (ref 10–20)
BUN SERPL-MCNC: 12 MG/DL (ref 6–23)
CALCIUM SERPL-MCNC: 9.5 MG/DL (ref 8.6–10.3)
CHLORIDE SERPL-SCNC: 87 MMOL/L (ref 98–107)
CO2 SERPL-SCNC: 29 MMOL/L (ref 21–32)
CREAT SERPL-MCNC: 0.61 MG/DL (ref 0.5–1.05)
EGFRCR SERPLBLD CKD-EPI 2021: >90 ML/MIN/1.73M*2
GLUCOSE SERPL-MCNC: 87 MG/DL (ref 74–99)
POTASSIUM SERPL-SCNC: 4.4 MMOL/L (ref 3.5–5.3)
SODIUM SERPL-SCNC: 123 MMOL/L (ref 136–145)

## 2024-11-01 PROCEDURE — 80048 BASIC METABOLIC PNL TOTAL CA: CPT

## 2024-11-08 ENCOUNTER — TELEPHONE (OUTPATIENT)
Dept: PRIMARY CARE | Facility: CLINIC | Age: 69
End: 2024-11-08
Payer: COMMERCIAL

## 2024-11-12 DIAGNOSIS — E87.1 HYPONATREMIA: ICD-10-CM

## 2024-11-12 RX ORDER — SODIUM CHLORIDE 1000 MG
1 TABLET, SOLUBLE MISCELLANEOUS 3 TIMES DAILY
Qty: 90 TABLET | Refills: 0 | Status: SHIPPED | OUTPATIENT
Start: 2024-11-12

## 2024-11-22 ENCOUNTER — PATIENT OUTREACH (OUTPATIENT)
Dept: PRIMARY CARE | Facility: CLINIC | Age: 69
End: 2024-11-22
Payer: COMMERCIAL

## 2024-11-22 NOTE — PROGRESS NOTES
"Final call. Called and spoke with patient to address any questions or concerns regarding hospitalization.   Patient reports their condition has (returned to baseline). States she is dong \"very well\" during call and has no questions or concerns to note at time of call.  Patient encouraged to keep my contact information in case any needs arise.     "

## 2024-12-04 ENCOUNTER — APPOINTMENT (OUTPATIENT)
Dept: ORTHOPEDIC SURGERY | Facility: CLINIC | Age: 69
End: 2024-12-04
Payer: COMMERCIAL

## 2024-12-11 ENCOUNTER — APPOINTMENT (OUTPATIENT)
Dept: ORTHOPEDIC SURGERY | Facility: CLINIC | Age: 69
End: 2024-12-11
Payer: COMMERCIAL

## 2024-12-11 ENCOUNTER — HOSPITAL ENCOUNTER (OUTPATIENT)
Dept: RADIOLOGY | Facility: HOSPITAL | Age: 69
Discharge: HOME | End: 2024-12-11
Payer: COMMERCIAL

## 2024-12-11 VITALS — WEIGHT: 125 LBS | BODY MASS INDEX: 21.46 KG/M2

## 2024-12-11 DIAGNOSIS — S42.021A CLOSED DISPLACED FRACTURE OF SHAFT OF RIGHT CLAVICLE, INITIAL ENCOUNTER: ICD-10-CM

## 2024-12-11 DIAGNOSIS — S42.021A CLOSED DISPLACED FRACTURE OF SHAFT OF RIGHT CLAVICLE, INITIAL ENCOUNTER: Primary | ICD-10-CM

## 2024-12-11 PROCEDURE — 73000 X-RAY EXAM OF COLLAR BONE: CPT | Mod: RIGHT SIDE | Performed by: RADIOLOGY

## 2024-12-11 PROCEDURE — 73000 X-RAY EXAM OF COLLAR BONE: CPT | Mod: RT

## 2024-12-11 ASSESSMENT — PAIN SCALES - GENERAL: PAINLEVEL_OUTOF10: 0 - NO PAIN

## 2024-12-11 ASSESSMENT — PAIN - FUNCTIONAL ASSESSMENT: PAIN_FUNCTIONAL_ASSESSMENT: 0-10

## 2024-12-11 NOTE — PROGRESS NOTES
69-year-old female follow-up for her right clavicle fracture.  Patient states she is doing very well.  She states she gets occasional pinching in the area of the right clavicle but overall is very pleased and has full use of the right arm without any pain.    Patients' self reported past medical history, medications, allergies, surgical history, family and social history as well as a 10 point review of systems has been documented in the new patient intake form and scanned into the patient's electronic medical record.  The intake form was reviewed by Dr Fisher during the office visit and signed by Dr. Fisher and the patient.  Pertinent findings are documented in the HPI.    General Multi-System Physical Exam:  Constitutional  General appearance:  Alert, oriented, and in no acute distress.  Well developed, well nourished.  Head and Face  Head and face:  Normocephalic and atraumatic.  Ears, Nose, Mouth, and Throat  External inspection of ears and nose: Normal.  Eyes:  Pupils are equal and round.  Neck  Neck:  no neck mass was observed.  Pulmonary  Respiratory effort:  no respiratory distress.  Cardiovascular  Intact distal pulses.  Lymphatic  Palpation of lymph nodes in the affected extremity:  Normal.  Skin  Skin and subcutaneous tissue:  Normal skin color and pigmentation.  Normal skin turgor.  No rashes.  Neurologic  Sensation:  normal to light touch.  Psychiatric  Judgement and insight:  Intact.  Mood and affect:  Normal.  Musculoskeletal  The patient has minimal pain to palpation over the clavicle with some step-off in the mid clavicle.  Grossly neurovasc intact with full range of motion right shoulder actively and passively    X-rays of the patient were ordered by Dr Fisher and obtained today.  Dr Fisher personally reviewed the results of the x-rays.    In addition, Dr Fisher independently interpreted the patient's x-rays (performed by the Radiology department) by viewing the x-ray images and this is Dr. Fisher's  personal interpretation:     X-rays of the right clavicle show very slow healing with possible developing malunion of the right clavicle fracture    I had a long discussion the patient in regards to her right clavicle.  We talked about the fact that this is now been 4 months since she broke her clavicle.  X-rays do not show clear union and she may be going on to a nonunion of the clavicle.  However the patient states she is doing very well with very little pain and full use of the right arm.  As a result I think she is possibly developing a painless nonunion.  The patient is doing very well and has essentially no problems with the right arm.  Therefore I think we can treat this nonoperatively.  I told her if she starts getting any worsening symptoms she should come back in to see me otherwise she can see me back on an as-needed basis.        This is an acute injury complicated by right clavicle fracture with possible pending nonunion    Due to this patient's condition, they are at a moderate risk of morbidity from additional diagnostic testing / treatment.

## 2025-01-27 DIAGNOSIS — F10.90 ALCOHOL USE DISORDER: ICD-10-CM

## 2025-01-27 RX ORDER — FOLIC ACID 1 MG/1
1 TABLET ORAL DAILY
Qty: 30 TABLET | Refills: 3 | Status: SHIPPED | OUTPATIENT
Start: 2025-01-27

## 2025-01-27 RX ORDER — LANOLIN ALCOHOL/MO/W.PET/CERES
400 CREAM (GRAM) TOPICAL DAILY
Qty: 30 TABLET | Refills: 3 | Status: SHIPPED | OUTPATIENT
Start: 2025-01-27

## 2025-02-14 DIAGNOSIS — E51.9 THIAMINE DEFICIENCY: ICD-10-CM

## 2025-02-14 RX ORDER — LANOLIN ALCOHOL/MO/W.PET/CERES
100 CREAM (GRAM) TOPICAL DAILY
Qty: 90 TABLET | Refills: 3 | Status: SHIPPED | OUTPATIENT
Start: 2025-02-14

## 2025-02-16 DIAGNOSIS — F32.A DEPRESSION, UNSPECIFIED: ICD-10-CM

## 2025-02-17 RX ORDER — FLUOXETINE HYDROCHLORIDE 20 MG/1
20 CAPSULE ORAL DAILY
Qty: 90 CAPSULE | Refills: 3 | Status: SHIPPED | OUTPATIENT
Start: 2025-02-17
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
## 2025-04-14 ENCOUNTER — APPOINTMENT (OUTPATIENT)
Dept: PRIMARY CARE | Facility: CLINIC | Age: 70
End: 2025-04-14
Payer: COMMERCIAL

## 2025-04-14 DIAGNOSIS — G40.909 SEIZURE DISORDER (MULTI): ICD-10-CM

## 2025-04-14 DIAGNOSIS — E83.42 HYPOMAGNESEMIA: ICD-10-CM

## 2025-04-14 DIAGNOSIS — I10 PRIMARY HYPERTENSION: ICD-10-CM

## 2025-04-14 DIAGNOSIS — F10.90 ALCOHOL USE DISORDER: ICD-10-CM

## 2025-04-14 DIAGNOSIS — E87.1 HYPONATREMIA: Primary | ICD-10-CM

## 2025-04-14 RX ORDER — AMLODIPINE BESYLATE 10 MG/1
10 TABLET ORAL DAILY
Qty: 90 TABLET | Refills: 3 | Status: SHIPPED | OUTPATIENT
Start: 2025-04-14

## 2025-05-13 DIAGNOSIS — F10.90 ALCOHOL USE DISORDER: ICD-10-CM

## 2025-05-13 RX ORDER — LANOLIN ALCOHOL/MO/W.PET/CERES
1 CREAM (GRAM) TOPICAL DAILY
Qty: 30 TABLET | Refills: 3 | Status: SHIPPED | OUTPATIENT
Start: 2025-05-13

## 2025-05-13 RX ORDER — FOLIC ACID 1 MG/1
1 TABLET ORAL DAILY
Qty: 30 TABLET | Refills: 3 | Status: SHIPPED | OUTPATIENT
Start: 2025-05-13

## 2025-07-23 DIAGNOSIS — Z12.31 ENCOUNTER FOR SCREENING MAMMOGRAM FOR BREAST CANCER: ICD-10-CM

## 2025-09-01 DIAGNOSIS — F10.90 ALCOHOL USE DISORDER: ICD-10-CM

## 2025-09-02 RX ORDER — LANOLIN ALCOHOL/MO/W.PET/CERES
1 CREAM (GRAM) TOPICAL DAILY
Qty: 30 TABLET | Refills: 3 | Status: SHIPPED | OUTPATIENT
Start: 2025-09-02

## 2025-09-02 RX ORDER — FOLIC ACID 1 MG/1
1 TABLET ORAL DAILY
Qty: 30 TABLET | Refills: 3 | Status: SHIPPED | OUTPATIENT
Start: 2025-09-02

## 2025-10-14 ENCOUNTER — APPOINTMENT (OUTPATIENT)
Dept: PRIMARY CARE | Facility: CLINIC | Age: 70
End: 2025-10-14
Payer: COMMERCIAL